# Patient Record
Sex: MALE | Race: WHITE | Employment: OTHER | URBAN - METROPOLITAN AREA
[De-identification: names, ages, dates, MRNs, and addresses within clinical notes are randomized per-mention and may not be internally consistent; named-entity substitution may affect disease eponyms.]

---

## 2018-11-08 ENCOUNTER — TELEPHONE (OUTPATIENT)
Dept: CARDIOLOGY CLINIC | Age: 58
End: 2018-11-08

## 2018-11-26 ENCOUNTER — OFFICE VISIT (OUTPATIENT)
Dept: CARDIOLOGY CLINIC | Age: 58
End: 2018-11-26

## 2018-11-26 VITALS
OXYGEN SATURATION: 97 % | SYSTOLIC BLOOD PRESSURE: 135 MMHG | HEIGHT: 67 IN | BODY MASS INDEX: 34.37 KG/M2 | WEIGHT: 219 LBS | HEART RATE: 89 BPM | DIASTOLIC BLOOD PRESSURE: 90 MMHG

## 2018-11-26 DIAGNOSIS — E78.00 PURE HYPERCHOLESTEROLEMIA: ICD-10-CM

## 2018-11-26 DIAGNOSIS — I10 ESSENTIAL HYPERTENSION WITH GOAL BLOOD PRESSURE LESS THAN 140/90: Primary | ICD-10-CM

## 2018-11-26 DIAGNOSIS — I48.0 PAF (PAROXYSMAL ATRIAL FIBRILLATION) (HCC): ICD-10-CM

## 2018-11-26 RX ORDER — AMIODARONE HYDROCHLORIDE 200 MG/1
200 TABLET ORAL DAILY
Qty: 90 TAB | Refills: 3 | Status: SHIPPED | OUTPATIENT
Start: 2018-11-26 | End: 2019-03-12 | Stop reason: SDUPTHER

## 2018-11-26 RX ORDER — DILTIAZEM HYDROCHLORIDE EXTENDED-RELEASE TABLETS 180 MG/1
180 TABLET, EXTENDED RELEASE ORAL DAILY
COMMUNITY
End: 2019-03-12 | Stop reason: SDUPTHER

## 2018-11-26 NOTE — PROGRESS NOTES
[facility]ists    Mr. Arabella Dukes is a 65year old male with a history of paroxysmal atrial fibrillation and hyperlipidemia. Mr. Arabella Dukes is here to establish care with me. Mr. Arabella Dukes follows up at the cardiology department at the [facility].  He has been diagnosed with atrial fibrillation, paroxysmal in nature, since 2010. At times he has been symptomatic. The primary cardiologist at the [facility] recommended him to possibly undergo atrial fibrillation ablation at [address] [place] [address], however he underwent cardioversion. He is here today for second opinion. Mr. Arabella Dukes denies any history of hypertension, stroke or diabetes. He does have hyperlipidemia, however he is not on any medication as he had side effects from Atorvastatin. He is controlling it with diet. Mr. Arabella Dukes has occasional palpitations on and off, however it is not associated with any presyncope or syncope. He occasionally gets fatigued and tired. He has been told by  that he snores heavily and also stops breathing a few seconds during the nighttime. During the daytime occasionally he feels fatigued, tired and feels like he has to take naps. He does not report any presyncope or syncope. Denies any nausea, vomiting, abdominal pain, fever, chills, sputum production. No hematuria or other bleeding complaints    Past Medical History:   Diagnosis Date    A-fib [facility])     S/P cardioversion 08/18    HLD (hyperlipidemia)          No past surgical history on file. Current Outpatient Medications   Medication Sig    rivaroxaban (XARELTO) 20 mg tab tablet Take  by mouth daily.  dilTIAZem ER (CARDIZEM LA) 180 mg Tb24 tablet Take 180 mg by mouth daily.  amiodarone (CORDARONE) 200 mg tablet Take 1 Tab by mouth daily. No current facility-administered medications for this visit.         Allergies and Sensitivities:  No Known Allergies    Family History:  No family history on file. Social History:  Social History     Tobacco Use    Smoking status: Former Smoker     Types: Cigarettes     Last attempt to quit: 11/26/2008     Years since quitting: 10.0    Smokeless tobacco: Never Used   Substance Use Topics    Alcohol use: Not on file    Drug use: Not on file     He  reports that he quit smoking about 10 years ago. His smoking use included cigarettes. he has never used smokeless tobacco.  He  has no alcohol history on file. Review of Systems:  Cardiac symptoms as noted above in HPI. All others negative. Denies fatigue, malaise, skin rash, joint pain, blurring vision, photophobia, neck pain, hemoptysis, chronic cough, nausea, vomiting, hematuria, burning micturition, BRBPR, chronic headaches. Physical Exam:  BP Readings from Last 3 Encounters:   11/26/18 135/90         Pulse Readings from Last 3 Encounters:   11/26/18 89          Wt Readings from Last 3 Encounters:   11/26/18 219 lb (99.3 kg)       Constitutional: Oriented to person, place, and time. HENT: Head: Normocephalic and atraumatic. Eyes: Conjunctivae and extraocular motions are normal.   Neck: No JVD present. Carotid bruit is not appreciated. Cardiovascular: Irregular rhythm. No murmur, gallop or rubs appreciated  Lung: Breath sounds normal. No respiratory distress. No ronchi or rales appreciated  Abdominal: No tenderness. No rebound and no guarding. Musculoskeletal: There is no lower extremity edema. No cynosis  Lymphadenopathy:  No cervical or supraclavicular adenopathy appriciated. Neurological: No gross motor deficit noted. Skin: No visible skin rash noted. No Ear discharge noted  Psychiatric: Normal mood and affect. Good distal pulse      Review of Data  LABS:   No results found for: NA, K, CL, CO2, GLU, BUN, CREA  No flowsheet data found.   No results found for: GPT, ALT  No results found for: HBA1C, HGBE8, WFE4QEPH, SKM0LWRU    EKG  (10/18) A.fib    ECHO    STRESS TEST (EST, PHARM, NUC, ECHO etc)    IMPRESSION & PLAN:  Mr. Gustavo Leon is a 65year old male with a history of atrial fibrillation, hyperlipidemia. Atrial fibrillation:  He has been diagnosed with atrial fibrillation since 2010. In August, 2018 he underwent external cardioversion at [address]. It was successful, however within a few weeks he went back into atrial fibrillation. It appears somewhat asymptomatic. He is currently on calcium channel blocker for rate control and he is on Xarelto for stroke prophylaxis. I would recommend to continue same at this time. We discussed at length today about etiology and management of atrial fibrillation, including rhythm control strategy versus rate control strategy. Because he is symptomatic he would like to consider rhythm control strategy. Chemical cardioversion versus electrical cardioversion and also ablation was discussed in detail. It was concerning to me that he may have underlying sleep apnea and without appropriate diagnosis and even management. He may not have success in rhythm control strategy and rate control strategy. For that reason I am going to ask him to undergo sleep study. I am going to start him on Amiodarone 200 mg daily. After appropriate lowering with Amiodarone and after making sure he does not have any sleep apnea, I would recommend that he undergo electrical cardioversion after being on Amiodarone. He appears to be acceptable to this strategy rather than invasive electrical atrial fibrillation ablation. Sleep study has been ordered and Amiodarone has been started. Cardizem and Xarelto will be continued. Side effects of medication were discussed with the patient. He had a lipid profile and TSH checked at [facility] and everything was normal according to patient. Borderline hyperlipidemia:  He is on diet control.   In the past he was tried on Atorvastatin, however he had some side effects so he stopped taking that medication after taking two doses.  I would recommend to continue with diet and exercise program.    Hypertension:  His blood pressure today is 135/90 mmHg. He is on Diltiazem 180 mg daily, which I recommend to continue. Importance of diet and exercise was discussed with patient. This plan was discussed with patient who is in agreement. Thank you for allowing me to participate in patient care. Please feel free to call me if you have any question or concern. Yifan Toure MD  Please note: This document has been produced using voice recognition software. Unrecognized errors in transcription may be present.

## 2018-11-26 NOTE — PROGRESS NOTES
1. Have you been to the ER, urgent care clinic since your last visit? Hospitalized since your last visit? No    2. Have you seen or consulted any other health care providers outside of the [address] [place] [address] since your last visit? Include any pap smears or colon screening.  No

## 2018-12-03 ENCOUNTER — TELEPHONE (OUTPATIENT)
Dept: CARDIOLOGY CLINIC | Age: 58
End: 2018-12-03

## 2018-12-03 NOTE — TELEPHONE ENCOUNTER
Patient left message questioning if he should be taking Diltiazem and amiodarone.      Verbal order and read back per Italia Campa MD  Yes, patient needs both of these medications  If he still has concerns he needs to schedule a follow up visit in the office     [facility] for patient

## 2018-12-13 ENCOUNTER — TELEPHONE (OUTPATIENT)
Dept: CARDIOLOGY CLINIC | Age: 58
End: 2018-12-13

## 2018-12-13 NOTE — TELEPHONE ENCOUNTER
Incoming from pt. Two patient Identifiers confirmed. Pt stated his sleep study was cancelled with [facility] and he will get it through the [place] and have results faxed to us. Pt verbalized understanding.

## 2019-02-20 ENCOUNTER — TELEPHONE (OUTPATIENT)
Dept: SLEEP MEDICINE | Age: 59
End: 2019-02-20

## 2019-03-10 ENCOUNTER — HOSPITAL ENCOUNTER (OUTPATIENT)
Dept: SLEEP MEDICINE | Age: 59
Discharge: HOME | End: 2019-03-10
Payer: OTHER GOVERNMENT

## 2019-03-10 DIAGNOSIS — G47.33 OSA (OBSTRUCTIVE SLEEP APNEA): ICD-10-CM

## 2019-03-10 PROCEDURE — 95811 POLYSOM 6/>YRS CPAP 4/> PARM: CPT

## 2019-03-11 VITALS — HEART RATE: 72 BPM | DIASTOLIC BLOOD PRESSURE: 87 MMHG | SYSTOLIC BLOOD PRESSURE: 123 MMHG

## 2019-03-12 ENCOUNTER — OFFICE VISIT (OUTPATIENT)
Dept: CARDIOLOGY CLINIC | Age: 59
End: 2019-03-12

## 2019-03-12 VITALS
SYSTOLIC BLOOD PRESSURE: 125 MMHG | BODY MASS INDEX: 35.4 KG/M2 | DIASTOLIC BLOOD PRESSURE: 81 MMHG | OXYGEN SATURATION: 98 % | HEART RATE: 102 BPM | WEIGHT: 226 LBS

## 2019-03-12 DIAGNOSIS — G47.30 SLEEP APNEA, UNSPECIFIED TYPE: Primary | ICD-10-CM

## 2019-03-12 RX ORDER — AMIODARONE HYDROCHLORIDE 200 MG/1
200 TABLET ORAL DAILY
Qty: 90 TAB | Refills: 3 | Status: SHIPPED | OUTPATIENT
Start: 2019-03-12

## 2019-03-12 RX ORDER — DILTIAZEM HYDROCHLORIDE EXTENDED-RELEASE TABLETS 180 MG/1
180 TABLET, EXTENDED RELEASE ORAL DAILY
Qty: 90 TAB | Refills: 3 | Status: SHIPPED | OUTPATIENT
Start: 2019-03-12 | End: 2020-09-10 | Stop reason: SDUPTHER

## 2019-03-12 NOTE — PROGRESS NOTES
1. Have you been to the ER, urgent care clinic since your last visit? Hospitalized since your last visit? no    2. Have you seen or consulted any other health care providers outside of the [address] [place] [address] since your last visit? Include any pap smears or colon screening.  yes

## 2019-03-12 NOTE — PROGRESS NOTES
Cardiovascular Specialists    Mr. Shamika Joy is a 63 y.o. male with a history of paroxysmal atrial fibrillation and hyperlipidemia. Mr. Shamika Joy is here for follow-up appointment. Symptoms since last visit he just had his sleep study 2 days ago. He was told that he has sleep apnea. He denies any palpitation presyncope or syncope. He is taking all his medications regularly. He denies any chest pain or chest tightness. Denies any nausea, vomiting, abdominal pain, fever, chills, sputum production. No hematuria or other bleeding complaints    Past Medical History:   Diagnosis Date    A-fib [facility])     S/P cardioversion 08/18    HLD (hyperlipidemia)          No past surgical history on file. Current Outpatient Medications   Medication Sig    rivaroxaban (XARELTO) 20 mg tab tablet Take  by mouth daily.  dilTIAZem ER (CARDIZEM LA) 180 mg Tb24 tablet Take 180 mg by mouth daily.  amiodarone (CORDARONE) 200 mg tablet Take 1 Tab by mouth daily. No current facility-administered medications for this visit. Allergies and Sensitivities:  No Known Allergies    Family History:  No family history on file. Social History:  Social History     Tobacco Use    Smoking status: Former Smoker     Types: Cigarettes     Last attempt to quit: 11/26/2008     Years since quitting: 10.2    Smokeless tobacco: Never Used   Substance Use Topics    Alcohol use: Not on file    Drug use: Not on file     He  reports that he quit smoking about 10 years ago. His smoking use included cigarettes. he has never used smokeless tobacco.  He  has no alcohol history on file. Review of Systems:  Cardiac symptoms as noted above in HPI. All others negative. Denies fatigue, malaise, skin rash, joint pain, blurring vision, photophobia, neck pain, hemoptysis, chronic cough, nausea, vomiting, hematuria, burning micturition, BRBPR, chronic headaches.     Physical Exam:  BP Readings from Last 3 Encounters:   03/12/19 125/81   03/11/19 123/87   11/26/18 135/90         Pulse Readings from Last 3 Encounters:   03/12/19 (!) 102   03/11/19 72   11/26/18 89          Wt Readings from Last 3 Encounters:   03/12/19 226 lb (102.5 kg)   11/26/18 219 lb (99.3 kg)       Constitutional: Oriented to person, place, and time. HENT: Head: Normocephalic and atraumatic  Neck: No JVD present. Carotid bruit is not appreciated. Cardiovascular: Irregular rhythm. No murmur, gallop or rubs appreciated  Lung: Breath sounds normal. No respiratory distress. No ronchi or rales appreciated  Abdominal: No tenderness. No rebound and no guarding. Musculoskeletal: There is no lower extremity edema. No cynosis    Review of Data  LABS:   No results found for: NA, K, CL, CO2, GLU, BUN, CREA  No flowsheet data found. No results found for: GPT, ALT  No results found for: HBA1C, HGBE8, QER2EBKI, BFB2SCMZ, MDT7MSMK    EKG  (10/18) A.fib    ECHO    STRESS TEST (EST, PHARM, NUC, ECHO etc)    IMPRESSION & PLAN:  Mr. Lana Lindsay is a 65year old male with a history of atrial fibrillation, hyperlipidemia. Atrial fibrillation:  He has been diagnosed with atrial fibrillation since 2010. In August, 2018 he underwent external cardioversion at [place] [address] facility. It was successful, however within a few weeks he went back into atrial fibrillation. He is feeling better today. He is on appropriate medication. On exam he is still in atrial fibrillation. He is on amiodarone, Cardizem and Xarelto. I recommend to continue all. He has been diagnosed with sleep apnea 2 days ago and I am going to refer him to sleep specialist for further management. I would hold onto any rhythm control strategy with external cardioversion until he is treated appropriately for his sleep apnea. He is aware of this and we will make the referral.    Borderline hyperlipidemia:  He is on diet control.   In the past he was tried on Atorvastatin, however he had some side effects so he stopped taking that medication after taking two doses. I would recommend to continue with diet and exercise program.    Hypertension:  His blood pressure today is 125/81 mmHg. He is on Diltiazem 180 mg daily, which I recommend to continue. Sleep apnea: This was diagnosed 2 days ago by sleep study. I am going to make a referral to Dr. Angel Moore for further management    Importance of diet and exercise was discussed with patient. This plan was discussed with patient who is in agreement. Thank you for allowing me to participate in patient care. Please feel free to call me if you have any question or concern. Alfredo Salamanca MD  Please note: This document has been produced using voice recognition software. Unrecognized errors in transcription may be present.

## 2019-03-20 ENCOUNTER — TELEPHONE (OUTPATIENT)
Dept: CARDIOLOGY CLINIC | Age: 59
End: 2019-03-20

## 2019-03-20 NOTE — TELEPHONE ENCOUNTER
Patient wanted to inform office he was never contacted by doctors office he was referred to by Dr. Arnaldo Weston regarding his sleep apnea.

## 2019-03-21 NOTE — TELEPHONE ENCOUNTER
[facility] patient Identifiers confirmed. Advised pt per notes below. Pt verbalized understanding.

## 2019-05-22 ENCOUNTER — TELEPHONE (OUTPATIENT)
Dept: CARDIOLOGY CLINIC | Age: 59
End: 2019-05-22

## 2019-06-25 ENCOUNTER — TELEPHONE (OUTPATIENT)
Dept: CARDIOLOGY CLINIC | Age: 59
End: 2019-06-25

## 2019-06-25 NOTE — TELEPHONE ENCOUNTER
Patient was referred to Pulmonary Specialist for evaluation after sleep study testing, patient has [employer] and will need an authorization to Pulmonary. Please call Huseyin Rausch at [phone] with any question or authorization information.

## 2019-06-26 ENCOUNTER — OFFICE VISIT (OUTPATIENT)
Dept: PULMONOLOGY | Age: 59
End: 2019-06-26

## 2019-06-26 VITALS
TEMPERATURE: 98.2 F | HEIGHT: 67 IN | HEART RATE: 78 BPM | BODY MASS INDEX: 33.95 KG/M2 | SYSTOLIC BLOOD PRESSURE: 126 MMHG | OXYGEN SATURATION: 97 % | DIASTOLIC BLOOD PRESSURE: 70 MMHG | WEIGHT: 216.3 LBS | RESPIRATION RATE: 18 BRPM

## 2019-06-26 DIAGNOSIS — Z87.891 EX-SMOKER: ICD-10-CM

## 2019-06-26 DIAGNOSIS — J30.9 ALLERGIC RHINITIS, UNSPECIFIED SEASONALITY, UNSPECIFIED TRIGGER: ICD-10-CM

## 2019-06-26 DIAGNOSIS — E66.9 OBESITY (BMI 30-39.9): ICD-10-CM

## 2019-06-26 DIAGNOSIS — G47.33 OSA (OBSTRUCTIVE SLEEP APNEA): Primary | ICD-10-CM

## 2019-06-26 DIAGNOSIS — I48.20 CHRONIC ATRIAL FIBRILLATION (HCC): ICD-10-CM

## 2019-06-26 DIAGNOSIS — E78.5 HYPERLIPIDEMIA, UNSPECIFIED HYPERLIPIDEMIA TYPE: ICD-10-CM

## 2019-06-26 RX ORDER — FLUTICASONE PROPIONATE 50 MCG
2 SPRAY, SUSPENSION (ML) NASAL DAILY
Qty: 1 BOTTLE | Refills: 3 | Status: SHIPPED | OUTPATIENT
Start: 2019-06-26 | End: 2020-09-10 | Stop reason: ALTCHOICE

## 2019-06-26 RX ORDER — LORATADINE 10 MG/1
10 TABLET ORAL DAILY
Qty: 90 TAB | Refills: 3 | Status: SHIPPED | OUTPATIENT
Start: 2019-06-26 | End: 2020-09-10 | Stop reason: ALTCHOICE

## 2019-06-26 NOTE — LETTER
6/26/19 Patient: Khadijah Vergara YOB: 1960 Date of Visit: 6/26/2019 Sandro Jansen MD 
[address] [facility] [address] VIA Facsimile: [phone] Nay [place] MD 
[address] [facility] [address] [facility] [place] [id number] VIA In Basket Dear MD Nay Mcelroy MD, Thank you for referring Mr. Khadijah Vergara to [facility] [place] for evaluation. My notes for this consultation are attached. If you have questions, please do not hesitate to call me. I look forward to following your patient along with you.  
 
 
Sincerely, 
 
Chasidy Vargas, DO

## 2019-06-26 NOTE — PROGRESS NOTES
Linda Larsen presents today for   Chief Complaint   Patient presents with    Sleep Apnea       Is someone accompanying this pt? No    Is the patient using any DME equipment during OV? No    -DME Company None    Depression Screening:  3 most recent PHQ Screens 6/26/2019   Little interest or pleasure in doing things Not at all   Feeling down, depressed, irritable, or hopeless Not at all   Total Score PHQ 2 0       Learning Assessment:  Learning Assessment 6/26/2019   PRIMARY LEARNER Patient   PRIMARY LANGUAGE ENGLISH   LEARNER PREFERENCE PRIMARY DEMONSTRATION     READING   ANSWERED BY Patient   RELATIONSHIP SELF       Abuse Screening:  No flowsheet data found. Fall Risk  No flowsheet data found. Coordination of Care:  1. Have you been to the ER, urgent care clinic since your last visit? Hospitalized since your last visit? No    2. Have you seen or consulted any other health care providers outside of the [address] [place] [address] since your last visit? Include any pap smears or colon screening.  No

## 2019-06-26 NOTE — PROGRESS NOTES
[facility]  Pulmonary, Critical Care, and Sleep Medicine    Office Progress Note- Initial Evaluation      Primary Care Physician: Frida López MD     Reason for Visit:  Evaluation for : Obstructive Sleep Apnea (STEVEN)      Assessment:  1. Obstructive Sleep Apnea (STEVEN)- Severe (AHI: 40- 3/10/19)- currently not treated  2. Atrial Fibrillation (AFIB)- On Amiodarone, Xarelto and Cardizem- Cardiology: Dr. Carey Johnson  3. Allergic Rhinnitis  4. Hyperlipidemia  5. Obesity: Body mass index is 33.88 kg/m². 6. Ex-Smoker           Plan:    · Schedule patient for  BIPAP sleep study for further evaluation. · Start a trial of Flonase and Claritin- Written Rx given to patient to take to [facility]  · Potential consequences of untreated sleep apnea, and/or excessive daytime sleepiness were discussed with the patient. · Educational materials provided. · Treatment options including CPAP, dental appliance, weight reduction measures, positional therapy, surgeries etc were discussed. · Healthy lifestyle changes to include weight loss and exercise discussed. · Healthy sleep habits were reviewed and encouraged. ·  and workplace safety reviewed and discussed as appropriate. Drowsy and/or inattentive driving should be avoided. · Follow-up in after sleep study , sooner should new symptoms or problems arise. · Follow up with Primary Care Provider (PCP) as directed and for routine health care maintenance. History of Present Illness: Mr. Martina Morales is a 63 y.o. male patient who was referred to us by his OP Cardiologist, Dr. Carey Johnson. . The history was provided by the patient. The patient receives much of his care from the [address] [place] [address]. During the patient's workup with cardiology, he was sent for a sleep study which was notable for severe STEVEN. A Split study was performed and the patient was titrated to a final CPAP setting of 19 with persistent respiratory events. The patient now presents to our clinic for additional evaluation and discussion of test results and discussion for further plan of care. Occupation:  Unemployed, ,  , [address]                    Driving:  yes  Drowsy Driving: is not reported. Motor vehicle accident(s) associated with drowsy driving have not occurred. Snoring: This is a Chronic problem which has been ongoing for years. Witnessed apneas are reported by prior girl friend who would shake him awake. Fatigue: This is a Chronic problem which has been ongoing for years. Dental: Teeth clenching or grindingis not reported. Naps: are not reported. He can relax enough during the day to fall asleep. Leg Symptoms/Pain: He does not have unpleasant or crawling sensation in legs or strong urge to move when inactive. GERD: is not reported. Mood: Feels that he is more \"even keel\", tries to keep positive outlook. Denies depression or anxiety. Sleep-Wake History:     Estimates sleeping approximately 6-8 hours per night/day. Reports sleeping in a Bed with 2, thin pillows. He gets into bed at approximately . Once in bed,he turns the lights out and goes to sleep. It usually takes up to 60  minutes to fall asleep after going to bed. Reports waking up to use the bathroom 1-2 times nightly. Pain, typically does not disturb their sleep. Vivid dreams are reported when he wakes up in the morning to use the bathroom and he then goes back to bed. He normally awakens with an alarm to start their day at 0500. He  typically gets out of bed at [id number] . Waking up with a morning headache is not reported. Awakening with a dry mouth is reported. Symptom(s) suggestive of cataplexy are not reported. Sleep paralysis is not  reported. Hypnagogic and/or hypnopompic hallucinations is not . Sleep walking is not  reported. Sleep talking is not  report.     Other unusual and/or parasomnia behaviors are not reported. Family Sleep History: Not known    Stop Cristobal Prater 6/26/2019 3/10/2019   Does the patient snore loudly (louder than talking or loud enough to be heard through closed doors)? 0 1   Does the patient often feel tired, fatigued, or sleepy during the daytime, even after a \"good\" night's sleep? 0 1   Has anyone ever observed the patient stop breathing during their sleep? 1 1   Does the patient have or are they being treated for high blood pressure? 0 0   Is the patient's BMI greater than 35? 0 0   Is your neck circumference greater than 17 inches (Male) or 16 inches (Female)? 0 0   Is the patient older than 48? 1 1   Is the patient male? 1 1   STEVEN Score 3 5   Has the patient been referred to Sleep Medicine? - 0   Has the patient previously been diagnosed with Obstructive Sleep Apnea? - 0       3 most recent PHQ Screens 6/26/2019   Little interest or pleasure in doing things Not at all   Feeling down, depressed, irritable, or hopeless Not at all   Total Score PHQ 2 0       [place] Scale 6/26/2019   Sitting and Reading 0   Watching TV 1   Sitting, inactive in a public place (e.g. a movie theater or meeting) 0   As a passenger in a car for an hour, without a break 0   Lying down to rest in the afternoon, when circumstances permit 1   Sitting and talking to someone 0   Sitting quietly after lunch without alcohol 0   In a car, while stopped for a few minutes in traffic 0   [place] Sleepiness Score 2        Neck circ. in \"inches\": 17     Past Medical History:  Past Medical History:   Diagnosis Date    A-fib [facility])     S/P cardioversion 08/18    HLD (hyperlipidemia)     Sleep apnea        Past Surgical History:  History reviewed. No pertinent surgical history. Family History:  History reviewed. No pertinent family history.     Social History:  Social History     Tobacco Use    Smoking status: Former Smoker     Types: Cigarettes     Last attempt to quit: 11/26/2008     Years since quitting: 10.5    Smokeless tobacco: Never Used   Substance Use Topics    Alcohol use: Not on file    Drug use: Not on file        Caffeine Amount Time of last Intake Comments   Coffee 16 oz/am     Soda 2x/week  Root Beet- Non Caffiated   Tea None     Energy Drinks None     Over- the - counter stimulant pills None     Other Substances      Alcohol 2x/years  Use to be a nightly drinker in 20's   Tobacco Former     Drugs None  Distant Cocaine 1998       Medications:  Current Outpatient Medications on File Prior to Visit   Medication Sig Dispense Refill    rivaroxaban (XARELTO) 20 mg tab tablet Take 1 Tab by mouth daily. 90 Tab 3    dilTIAZem ER (CARDIZEM LA) 180 mg Tb24 tablet Take 1 Tab by mouth daily. 90 Tab 3    amiodarone (CORDARONE) 200 mg tablet Take 1 Tab by mouth daily. 90 Tab 3     No current facility-administered medications on file prior to visit.          Allergy:  No Known Allergies    Review of Systems  General ROS: positive for  - fatigue and sleep disturbance  negative for - chills, fever, hot flashes, malaise or night sweats  ENT ROS: negative for - epistaxis, hearing change, nasal polyps, oral lesions, sinus pain, sneezing, sore throat, tinnitus, vertigo, visual changes or vocal changes, positive for chronic nasal congestion  Hematological and Lymphatic ROS: negative for - bleeding problems, blood clots, bruising, jaundice, pallor or swollen lymph nodes  Endocrine ROS: negative for - polydipsia/polyuria, skin changes, temperature intolerance or unexpected weight changes  Respiratory ROS: no cough, shortness of breath, or wheezing  Cardiovascular ROS: no chest pain or dyspnea on exertion  Gastrointestinal ROS: no abdominal pain, change in bowel habits, or black or bloody stools  Genito-Urinary ROS: no dysuria, trouble voiding, or hematuria  Musculoskeletal ROS: as above  Neurological ROS: no TIA or stroke symptoms  Dermatological ROS: negative for - pruritus, rash or skin lesion changes Psychological ROS: as above   Otherwise negative. Physical Exam:  Blood pressure 126/70, pulse 78, temperature 98.2 °F (36.8 °C), temperature source Oral, resp. rate 18, height 5' 7\" (1.702 m), weight 98.1 kg (216 lb 4.8 oz), SpO2 97 %. on RA, Body mass index is 33.88 kg/m². General: No distress, acyanotic, appears stated age, cooperative, pleasant  HEENT: PERRL, EOMI, throat without erythema or exudate, Tongue- normal size, without dental indention on tongue, narrow oral airway Mallampati's score 4+, Uvula- midline, Tonsils- not seen, Moderate retrognathia present, + nasal congestion- boggy turbinates, narrow nasal passages. Neck: Supple,  no abnormally enlarged lymph nodes, thyroid is not enlarged, non-tender, No JVD, no carotid bruits  Chest: Normal.  Lungs: Moderate air entry, clear to auscultation bilaterally,   Heart:  IRRegular rate and rhythm, S1S2 present, without murmur. Abdomen: Protuberant, bowel sounds normoactive, abdomen is soft without significant tenderness, or guarding. Extremity: Negative for cyanosis, edema, or clubbing. Skin: Skin color, texture, turgor normal. No rashes or lesions. Neurological: CN 2-12 grossly intact, normal muscle tone. Data Reviewed:  CBC: No results found for: WBC, WBCLT, HGBPOC, HGB, HGBP, HCTPOC, HCT, PHCT, RBCH, PLT, MCV, HGBEXT, HCTEXT, PLTEXT, HGBEXT, HCTEXT, PLTEXT    BMP: No results found for: NA, K, CL, CO2, AGAP, GLU, BUN, CREA, BUCR, GFRAA, GFRNA, CA, GFRAA     TSH:  No results found for: TSH, TSH2, TSH3, TSHP, TSHELE, TSHEXT, TSHEXT     No results found for: BNP, BNPP, BNPPPOC, XBNPT, BNPNT      Imaging:  [x]I have personally reviewed the patients radiographs section   No results found for this or any previous visit. No results found for this or any previous visit.      Cardiac Echo:          Historical Sleep Testing Data:  - Split Study: [facility]: 3/10/19: AHI: 40, RDI:44, Spo2 michelle: 86%, SOLOMON: 39.5        Pj Lugo DO, FCCP  Pulmonary, Sleep and Critical Care Medicine

## 2019-07-02 NOTE — TELEPHONE ENCOUNTER
Spoke to [place], he was approved for sleep consult already. Auth # [id number]  Sleep study/consult valid until September 2019     Radha Bull at [place] states he is up to date with his auth/referrals for cardiology and sleep.

## 2019-07-07 ENCOUNTER — HOSPITAL ENCOUNTER (OUTPATIENT)
Dept: SLEEP MEDICINE | Age: 59
Discharge: HOME | End: 2019-07-07
Payer: OTHER GOVERNMENT

## 2019-07-07 DIAGNOSIS — E66.9 OBESITY (BMI 30-39.9): ICD-10-CM

## 2019-07-07 DIAGNOSIS — J30.9 ALLERGIC RHINITIS, UNSPECIFIED SEASONALITY, UNSPECIFIED TRIGGER: ICD-10-CM

## 2019-07-07 DIAGNOSIS — I48.20 CHRONIC ATRIAL FIBRILLATION (HCC): ICD-10-CM

## 2019-07-07 DIAGNOSIS — G47.33 OSA (OBSTRUCTIVE SLEEP APNEA): ICD-10-CM

## 2019-07-07 PROCEDURE — 95811 POLYSOM 6/>YRS CPAP 4/> PARM: CPT

## 2019-07-08 VITALS — SYSTOLIC BLOOD PRESSURE: 125 MMHG | HEART RATE: 70 BPM | DIASTOLIC BLOOD PRESSURE: 85 MMHG

## 2019-07-09 ENCOUNTER — OFFICE VISIT (OUTPATIENT)
Dept: CARDIOLOGY CLINIC | Age: 59
End: 2019-07-09

## 2019-07-09 ENCOUNTER — DOCUMENTATION ONLY (OUTPATIENT)
Dept: PULMONOLOGY | Age: 59
End: 2019-07-09

## 2019-07-09 VITALS
DIASTOLIC BLOOD PRESSURE: 71 MMHG | OXYGEN SATURATION: 97 % | HEART RATE: 84 BPM | BODY MASS INDEX: 33.52 KG/M2 | SYSTOLIC BLOOD PRESSURE: 120 MMHG | WEIGHT: 214 LBS

## 2019-07-09 DIAGNOSIS — I10 ESSENTIAL HYPERTENSION WITH GOAL BLOOD PRESSURE LESS THAN 140/90: ICD-10-CM

## 2019-07-09 DIAGNOSIS — I48.0 PAF (PAROXYSMAL ATRIAL FIBRILLATION) (HCC): Primary | ICD-10-CM

## 2019-07-09 DIAGNOSIS — E78.00 PURE HYPERCHOLESTEROLEMIA: ICD-10-CM

## 2019-07-09 DIAGNOSIS — G47.30 SLEEP APNEA, UNSPECIFIED TYPE: ICD-10-CM

## 2019-07-09 NOTE — PROGRESS NOTES
The patient underwent sleep testing on 7/7/19. Recommendations listed below. Please refer to full report for specific details. Overall, the patient tolerated study well but did report some difficulty with pressure. Central apneas without Jazmin Fendt emerged with onset of CPAP titration. The patient was subsequently changed to BIPAP. Central events significantly improved as did sleep architecture but some central events did persist.  Complex sleep physiology appeared failure well optimized at a BIPAP setting of 19/15. REM sleep was achieved in the lateral recumbent position. REM supine sleep was not achieved at recommended pressure. <BR>PRESSURES USED DURING THE STUDY: 8/4, 9/5, 10/6, 11/7, 12/7, 13/7, 14/9, 16/11, 17/12, 19/14, 19/15 cwp. DIAGNOSIS:  Complex Sleep Apnea ()    RECOMMENDATIONS:      Start patient on BIPAP 19/15 with back up respiratory rate of 8 breaths per minute.  Positional therapy with the patient sleeping on their side is encouraged.  Other non-invasive treatment options are recommended were applicable and include the following: weight reduction, smoking cessation, body position training, and modification of alcohol ingestion and/or sedating agents.  If these treatments are not possible or effective, an oral appliance may be an alternative and/or adjuvant non-invasive treatment.  If non-invasive treatments are not possible or effective, consideration may be given to possible corrective surgical options.  Healthy sleep habit education and reinforcement will be reviewed with the patient.  Individuals are encouraged to obtain 7-9 hours of sleep per day.   safety is encouraged. Drowsy and/or inattentive driving should be avoided.  Follow up with referring sleep provider as directed.       Erick Saunders DO, [facility]    [facility]  Pulmonary, Critical Care, and Sleep Medicine

## 2019-07-09 NOTE — PROGRESS NOTES
[facility]    Mr. Silke Emerson is a 63 y.o. male with a history of paroxysmal atrial fibrillation and hyperlipidemia. Mr. Silke Emerson is here for follow-up appointment. She has been diagnosed with severe sleep apnea and had a titration study done last week. He is awaiting appointment with sleep apnea clinic. He is taking cardiac medications regularly. He denies any symptoms to suggest angina or heart failure he denies any palpitation, presyncope or syncope. He is taking all his medications regularly  Denies any nausea, vomiting, abdominal pain, fever, chills, sputum production. No hematuria or other bleeding complaints    Past Medical History:   Diagnosis Date    A-fib [facility])     S/P cardioversion 08/18    HLD (hyperlipidemia)     Sleep apnea          No past surgical history on file. Current Outpatient Medications   Medication Sig    fluticasone propionate (FLONASE ALLERGY RELIEF) 50 mcg/actuation nasal spray 2 Sprays by Both Nostrils route daily. Indications: inflammation of the nose due to an allergy    loratadine (CLARITIN) 10 mg tablet Take 1 Tab by mouth daily.  rivaroxaban (XARELTO) 20 mg tab tablet Take 1 Tab by mouth daily.  dilTIAZem ER (CARDIZEM LA) 180 mg Tb24 tablet Take 1 Tab by mouth daily.  amiodarone (CORDARONE) 200 mg tablet Take 1 Tab by mouth daily. No current facility-administered medications for this visit. Allergies and Sensitivities:  No Known Allergies    Family History:  No family history on file. Social History:  Social History     Tobacco Use    Smoking status: Former Smoker     Types: Cigarettes     Last attempt to quit: 11/26/2008     Years since quitting: 10.6    Smokeless tobacco: Never Used   Substance Use Topics    Alcohol use: Not on file    Drug use: Not on file     He  reports that he quit smoking about 10 years ago. His smoking use included cigarettes.  He has never used smokeless tobacco.  He  has no alcohol history on file. Review of Systems:  Cardiac symptoms as noted above in HPI. All others negative. Denies fatigue, malaise, skin rash, joint pain, blurring vision, photophobia, neck pain, hemoptysis, chronic cough, nausea, vomiting, hematuria, burning micturition, BRBPR, chronic headaches. Physical Exam:  BP Readings from Last 3 Encounters:   07/09/19 120/71   07/08/19 125/85   06/26/19 126/70         Pulse Readings from Last 3 Encounters:   07/09/19 84   07/08/19 70   06/26/19 78          Wt Readings from Last 3 Encounters:   07/09/19 214 lb (97.1 kg)   06/26/19 216 lb 4.8 oz (98.1 kg)   03/12/19 226 lb (102.5 kg)       Constitutional: Oriented to person, place, and time. HENT: Head: Normocephalic and atraumatic  Neck: No JVD present. Carotid bruit is not appreciated. Cardiovascular: Irregular rhythm. No murmur, gallop or rubs appreciated  Lung: Breath sounds normal. No respiratory distress. No ronchi or rales appreciated  Abdominal: No tenderness. No rebound and no guarding. Musculoskeletal: There is no lower extremity edema. No cynosis    Review of Data  LABS:   No results found for: NA, K, CL, CO2, GLU, BUN, CREA  No flowsheet data found. No results found for: GPT, ALT  No results found for: HBA1C, HGBE8, QDO9MHAG, VSZ1PYDS, VTX9JKJV    EKG  (10/18) A.fib    ECHO    STRESS TEST (EST, PHARM, NUC, ECHO etc)    IMPRESSION & PLAN:  Mr. Win Varela is a 65year old male with a history of atrial fibrillation, hyperlipidemia. Atrial fibrillation:  He has been diagnosed with atrial fibrillation since 2010. In August, 2018 he underwent external cardioversion at [address]. It was successful, however within a few weeks he went back into atrial fibrillation. He is on appropriate medication. On exam he is still in atrial fibrillation. He is on amiodarone, Cardizem and Xarelto. I recommend to continue all.   He has been diagnosed with sleep apnea and has undergone titration study. I would hold onto any rhythm control strategy with external cardioversion until he is treated appropriately for his sleep apnea. He is going to call sleep apnea clinic for follow-up appointment    Borderline hyperlipidemia:  He is on diet control. In the past he was tried on Atorvastatin, however he had some side effects so he stopped taking that medication after taking two doses. I would recommend to continue with diet and exercise program.    Hypertension:  His blood pressure today is 120/71 mmHg. He is on Diltiazem 180 mg daily, which I recommend to continue. Sleep apnea:  He has severe sleep apnea and has undergone titration sleep study. He is awaiting to follow-up with Dr. Corey Zamarripa for further definitive management    This plan was discussed with patient who is in agreement. Thank you for allowing me to participate in patient care. Please feel free to call me if you have any question or concern. Lynn Ames MD  Please note: This document has been produced using voice recognition software. Unrecognized errors in transcription may be present.

## 2019-07-09 NOTE — PROGRESS NOTES
1. Have you been to the ER, urgent care clinic since your last visit? Hospitalized since your last visit? No     2. Have you seen or consulted any other health care providers outside of the [address] [place] [address] since your last visit? Include any pap smears or colon screening.   No

## 2019-07-10 NOTE — PROGRESS NOTES
Just place a normal DME order. I will contact patient and see if he wants to  order or if he wants me to mail it to him.

## 2019-07-17 ENCOUNTER — TELEPHONE (OUTPATIENT)
Dept: PULMONOLOGY | Age: 59
End: 2019-07-17

## 2019-07-22 ENCOUNTER — TELEPHONE (OUTPATIENT)
Dept: PULMONOLOGY | Age: 59
End: 2019-07-22

## 2019-08-15 ENCOUNTER — TELEPHONE (OUTPATIENT)
Dept: CARDIOLOGY CLINIC | Age: 59
End: 2019-08-15

## 2019-08-15 NOTE — TELEPHONE ENCOUNTER
Patient called and LM on nursing line that he is supposed to get CPAP machine. Returned patient call and had to leave him a message. He should be calling the sleep clinic to get set up. I can see notes from the office that his test was positive.      LMOM with the sleep clinic phone number to follow up

## 2019-10-29 ENCOUNTER — OFFICE VISIT (OUTPATIENT)
Dept: CARDIOLOGY CLINIC | Age: 59
End: 2019-10-29

## 2019-10-29 VITALS
SYSTOLIC BLOOD PRESSURE: 142 MMHG | WEIGHT: 216 LBS | BODY MASS INDEX: 33.83 KG/M2 | DIASTOLIC BLOOD PRESSURE: 89 MMHG | OXYGEN SATURATION: 96 % | HEART RATE: 77 BPM

## 2019-10-29 DIAGNOSIS — I10 ESSENTIAL HYPERTENSION WITH GOAL BLOOD PRESSURE LESS THAN 140/90: ICD-10-CM

## 2019-10-29 DIAGNOSIS — E78.00 PURE HYPERCHOLESTEROLEMIA: ICD-10-CM

## 2019-10-29 DIAGNOSIS — G47.30 SLEEP APNEA, UNSPECIFIED TYPE: ICD-10-CM

## 2019-10-29 DIAGNOSIS — I48.0 PAF (PAROXYSMAL ATRIAL FIBRILLATION) (HCC): Primary | ICD-10-CM

## 2019-10-29 RX ORDER — CHOLECALCIFEROL (VITAMIN D3) 125 MCG
CAPSULE ORAL
COMMUNITY

## 2019-10-29 NOTE — PROGRESS NOTES
[facility]    Mr. Donte Fernandez is a 63 y.o. male with a history of paroxysmal atrial fibrillation and hyperlipidemia. Mr. Donte Fernandez is here for follow-up appointment. Patient has been diagnosed with sleep apnea and is trying to get used to the CPAP machine. He is not able to wear it consistently. He denies any chest pain or chest tightness. He has occasional palpitation. He denies presyncope or syncope. Overall his symptoms are improving  Denies any nausea, vomiting, abdominal pain, fever, chills, sputum production. No hematuria or other bleeding complaints    Past Medical History:   Diagnosis Date    A-fib [facility])     S/P cardioversion 08/18    HLD (hyperlipidemia)     Sleep apnea          No past surgical history on file. Current Outpatient Medications   Medication Sig    cholecalciferol, vitamin D3, (VITAMIN D3) 2,000 unit tab Take  by mouth.  rivaroxaban (XARELTO) 20 mg tab tablet Take 1 Tab by mouth daily.  dilTIAZem ER (CARDIZEM LA) 180 mg Tb24 tablet Take 1 Tab by mouth daily.  amiodarone (CORDARONE) 200 mg tablet Take 1 Tab by mouth daily.  fluticasone propionate (FLONASE ALLERGY RELIEF) 50 mcg/actuation nasal spray 2 Sprays by Both Nostrils route daily. Indications: inflammation of the nose due to an allergy    loratadine (CLARITIN) 10 mg tablet Take 1 Tab by mouth daily. No current facility-administered medications for this visit. Allergies and Sensitivities:  No Known Allergies    Family History:  No family history on file. Social History:  Social History     Tobacco Use    Smoking status: Former Smoker     Types: Cigarettes     Last attempt to quit: 11/26/2008     Years since quitting: 10.9    Smokeless tobacco: Never Used   Substance Use Topics    Alcohol use: Not on file    Drug use: Not on file     He  reports that he quit smoking about 10 years ago. His smoking use included cigarettes.  He has never used smokeless tobacco.  He  has no alcohol history on file. Review of Systems:  Cardiac symptoms as noted above in HPI. All others negative. Denies fatigue, malaise, skin rash, joint pain, blurring vision, photophobia, neck pain, hemoptysis, chronic cough, nausea, vomiting, hematuria, burning micturition, BRBPR, chronic headaches. Physical Exam:  BP Readings from Last 3 Encounters:   10/29/19 142/89   07/09/19 120/71   07/08/19 125/85         Pulse Readings from Last 3 Encounters:   10/29/19 77   07/09/19 84   07/08/19 70          Wt Readings from Last 3 Encounters:   10/29/19 216 lb (98 kg)   07/09/19 214 lb (97.1 kg)   06/26/19 216 lb 4.8 oz (98.1 kg)       Constitutional: Oriented to person, place, and time. HENT: Head: Normocephalic and atraumatic  Neck: No JVD present. Carotid bruit is not appreciated. Cardiovascular: Irregular rhythm. No murmur, gallop or rubs appreciated  Lung: Breath sounds normal. No respiratory distress. No ronchi or rales appreciated  Abdominal: No tenderness. No rebound and no guarding. Musculoskeletal: There is no lower extremity edema. No cynosis    Review of Data  LABS:   No results found for: NA, K, CL, CO2, GLU, BUN, CREA  No flowsheet data found. No results found for: GPT, ALT  No results found for: HBA1C, HGBE8, WMO4OXTT, BMH6WSFF, [id number]    EKG  (10/18) A.fib    ECHO    STRESS TEST (EST, PHARM, NUC, ECHO etc)    IMPRESSION & PLAN:  Mr. Fe Moreno is a 65year old male with a history of atrial fibrillation, hyperlipidemia. Atrial fibrillation:    He has been diagnosed with atrial fibrillation since 2010. In August, 2018 he underwent external cardioversion at [facility] facility. It was successful, however within a few weeks he went back into atrial fibrillation. He is on appropriate medication. On exam he is still in atrial fibrillation. He is on amiodarone, Cardizem and Xarelto. I recommend to continue all.     He has been diagnosed with sleep apnea and has undergone titration study. He is not using CPAP machine consistently and he has follow-up appointment with sleep specialist for adjustment of pressure in couple weeks. I would hold onto any rhythm control strategy with external cardioversion until he is treated appropriately for his sleep apnea. Also we are going to give him a trial of weight loss to help reduce A. fib episodes. We will see him back in 3 months and if he is still in A. fib, plan is to consider cardioversion after appropriate sleep apnea treatment    Borderline hyperlipidemia:  He is on diet control. In the past he was tried on Atorvastatin, however he had some side effects so he stopped taking that medication after taking two doses. I would recommend to continue with diet and exercise program.    Hypertension:   He is on Diltiazem 180 mg daily, which I recommend to continue. Sleep apnea:  He has severe sleep apnea and has undergone titration sleep study. He is awaiting to follow-up with Dr. Serafin Molina for further definitive management    Obesity:  Current weight 216 pound with BMI of 34. Weight loss advised to help with A. fib episodes. He understand and he is encouraged to lose weight. This plan was discussed with patient who is in agreement. Thank you for allowing me to participate in patient care. Please feel free to call me if you have any question or concern. Trish Espinoza MD  Please note: This document has been produced using voice recognition software. Unrecognized errors in transcription may be present.

## 2019-10-29 NOTE — PROGRESS NOTES
1. Have you been to the ER, urgent care clinic since your last visit? Hospitalized since your last visit? No     2. Have you seen or consulted any other health care providers outside of the [address] [place] [address] since your last visit? Include any pap smears or colon screening.  No

## 2019-11-22 ENCOUNTER — TELEPHONE (OUTPATIENT)
Dept: PULMONOLOGY | Age: 59
End: 2019-11-22

## 2020-09-10 ENCOUNTER — OFFICE VISIT (OUTPATIENT)
Dept: CARDIOLOGY CLINIC | Age: 60
End: 2020-09-10

## 2020-09-10 DIAGNOSIS — I48.0 PAF (PAROXYSMAL ATRIAL FIBRILLATION) (HCC): Primary | ICD-10-CM

## 2020-09-10 RX ORDER — DILTIAZEM HYDROCHLORIDE EXTENDED-RELEASE TABLETS 180 MG/1
180 TABLET, EXTENDED RELEASE ORAL DAILY
Qty: 90 TAB | Refills: 3
Start: 2020-09-10 | End: 2020-09-22 | Stop reason: SDUPTHER

## 2020-09-10 RX ORDER — DILTIAZEM HYDROCHLORIDE EXTENDED-RELEASE TABLETS 240 MG/1
240 TABLET, EXTENDED RELEASE ORAL DAILY
COMMUNITY
End: 2020-09-10

## 2020-09-10 NOTE — PATIENT INSTRUCTIONS
[facility]          Patient  Cardioversion Instructions                  1. You are scheduled to have a cardioversion on  9/18/2020 , at 12:30pm.    Please check in at 10:30 am .    2.    Please go to [facility] main entrance. Once you enter check in with the  there. The  will either give you directions or assist you in getting to the cath holding area. 3.  You are not to eat or drink anything after midnight the night before your procedure. 4. Please continue to take your medications with a small sip of water on the morning of the procedure with the following exceptions. 5. If you are diabetic, do not take your insulin/sugar pill the morning of the procedure. 6. We encourage families to wait in the waiting room on the first floor while the procedure is being done. The Doctor will come out and talk with you as soon as the procedure is over. 7. There is the possibility that you may spend the night in the hospital, depending on the results of the procedure. This will be determined after the procedure is done. 8.   If you or your family have any questions, please call our office Monday -Friday, 9:00 a.m.-4:30 p.m.,  At [phone], and ask to speak to one of the nurses. *Pre -procedure LABWORK is to be done within one week of your procedure date    **YOU WILL NEED SOMEONE TO DRIVE YOU HOME AFTER PROCEDURE.

## 2020-09-10 NOTE — PROGRESS NOTES
Noemi Cuevas presents today for   Chief Complaint   Patient presents with    Follow-up       Noemi Cuevas preferred language for health care discussion is english/other. Is someone accompanying this pt? no    Is the patient using any DME equipment during [address]? no    Depression Screening:  3 most recent PHQ Screens 9/10/2020   Little interest or pleasure in doing things Not at all   Feeling down, depressed, irritable, or hopeless Not at all   Total Score PHQ 2 0       Learning Assessment:  Learning Assessment 6/26/2019   PRIMARY LEARNER Patient   PRIMARY LANGUAGE ENGLISH   LEARNER PREFERENCE PRIMARY DEMONSTRATION     READING   ANSWERED BY Patient   RELATIONSHIP SELF       Abuse Screening:  completed    Fall Risk  completed    Pt currently taking Anticoagulant therapy? yes    Coordination of Care:  1. Have you been to the ER, urgent care clinic since your last visit? Hospitalized since your last visit? no    2. Have you seen or consulted any other health care providers outside of the [address] [place] [address] since your last visit? Include any pap smears or colon screening.   yes

## 2020-09-10 NOTE — PROGRESS NOTES
[facility]    Mr. Sammie Castro is a 63 y.o. male with a history of paroxysmal atrial fibrillation and hyperlipidemia. Mr. Sammie Castro is here for follow-up appointment. Patient has been diagnosed with sleep apnea and is trying to get used to the CPAP machine. He is not able to wear it consistently. He also had appointment with sleep apnea clinic at the [facility] and he was not able to tolerate wearing CPAP. He denies any chest pain or chest tightness. He has occasional palpitation. He denies presyncope or syncope. Overall his symptoms are improving  He had to go to [facility] for palpitation and was found to have a atrial flutter with rapid ventricular response. He tells me that his dose of diltiazem was increased however after increasing the dose, he started feeling fatigue and tired so he reduce his dose back to 280 mg daily. He is taking Xarelto and amiodarone regularly. Denies any nausea, vomiting, abdominal pain, fever, chills, sputum production. No hematuria or other bleeding complaints    Past Medical History:   Diagnosis Date    A-fib [facility])     S/P cardioversion     HLD (hyperlipidemia)     Sleep apnea          No past surgical history on file. Current Outpatient Medications   Medication Sig    cholecalciferol, vitamin D3, (VITAMIN D3) 2,000 unit tab Take  by mouth.  rivaroxaban (XARELTO) 20 mg tab tablet Take 1 Tab by mouth daily.  amiodarone (CORDARONE) 200 mg tablet Take 1 Tab by mouth daily.  dilTIAZem ER (Cardizem LA) 240 mg tablet Take 240 mg by mouth daily. No current facility-administered medications for this visit. Allergies and Sensitivities:  No Known Allergies    Family History:  No family history on file.     Social History:  Social History     Tobacco Use    Smoking status: Former Smoker     Types: Cigarettes     Last attempt to quit: 2008     Years since quittin.7    Smokeless tobacco: Never Used   Substance Use Topics    Alcohol use: Not on file    Drug use: Not on file     He  reports that he quit smoking about 11 years ago. His smoking use included cigarettes. He has never used smokeless tobacco.  He  has no history on file for alcohol. Review of Systems:  Cardiac symptoms as noted above in HPI. All others negative. Denies fatigue, malaise, skin rash, joint pain, blurring vision, photophobia, neck pain, hemoptysis, chronic cough, nausea, vomiting, hematuria, burning micturition, BRBPR, chronic headaches. Physical Exam:  BP Readings from Last 3 Encounters:   10/29/19 142/89   07/09/19 120/71   07/08/19 125/85         Pulse Readings from Last 3 Encounters:   10/29/19 77   07/09/19 84   07/08/19 70          Wt Readings from Last 3 Encounters:   10/29/19 216 lb (98 kg)   07/09/19 214 lb (97.1 kg)   06/26/19 216 lb 4.8 oz (98.1 kg)       Constitutional: Oriented to person, place, and time. HENT: Head: Normocephalic and atraumatic  Neck: No JVD present. Carotid bruit is not appreciated. Cardiovascular: Irregular rhythm. No murmur, gallop or rubs appreciated  Lung: Breath sounds normal. No respiratory distress. No ronchi or rales appreciated  Abdominal: No tenderness. No rebound and no guarding. Musculoskeletal: There is no lower extremity edema. No cynosis    Review of Data  LABS:   No results found for: NA, K, CL, CO2, GLU, BUN, CREA  No flowsheet data found. No results found for: ALT  No results found for: HBA1C, HGBE8, TVX6NXWE, MQY5NQXX, ROK8RREO    EKG  (10/18) A.fib  (09/20) atrial flutter with 81 bpm.  Nonspecific ST-T changes. ECHO    STRESS TEST (EST, PHARM, NUC, ECHO etc)    IMPRESSION & PLAN:  Mr. Rufino Torres is a 63 y.o. male with a history of atrial fibrillation, hyperlipidemia. Atrial fibrillation:   He has been diagnosed with atrial fibrillation since 2010. In August, 2018 he underwent external cardioversion at [address].   It was successful, however within a few weeks he went back into atrial fibrillation. He is on appropriate medication. He is on amiodarone, Cardizem and Xarelto. I recommend to continue all. Despite multiple attempt, he has not been able to tolerate CPAP machine and cannot use that. Recently was admitted to the hospital with atrial fibrillation with rapid medical response and dose of diltiazem was increased. He continues to have palpitation and symptoms related to atrial fibrillation including fatigue and tiredness. EKG confirmed atrial flutter. Because of his symptoms, I recommend that he consider cardioversion. He is on amiodarone and also Xarelto. Cardioversion with and without DAISY discussed. As patient is on Xarelto for prolonged. The time, ideally DAISY is not recommended. After lengthy discussion, he is agreeable with external cardioversion. Risk-benefit, alternatives of this management strategy discussed with patient in detail. All the risk associated with and complication associated with external cardioversion discussed with the patient. He is agreeable with the procedure. Will be reading moderate sedation    Borderline hyperlipidemia:  He is on diet control. In the past he was tried on Atorvastatin, however he had some side effects so he stopped taking that medication after taking two doses. I would recommend to continue with diet and exercise program.    Hypertension:   He is on Diltiazem 180 mg daily, which I recommend to continue. Sleep apnea:  He has severe sleep apnea and has undergone titration sleep study. He tells me that he is not able to tolerate CPAP machine. I have asked him to follow-up with sleep apnea team.    Obesity:  Current weight 226 pound with BMI of 35. Weight loss advised to help with A. fib episodes. He understand and he is encouraged to lose weight. This plan was discussed with patient who is in agreement.     Thank you for allowing me to participate in patient care. Please feel free to call me if you have any question or concern. Kira Morrison MD  Please note: This document has been produced using voice recognition software. Unrecognized errors in transcription may be present.

## 2020-09-15 ENCOUNTER — DOCUMENTATION ONLY (OUTPATIENT)
Dept: CARDIOLOGY CLINIC | Age: 60
End: 2020-09-15

## 2020-09-15 NOTE — PROGRESS NOTES
Patient already has prior auth from the [place] for cardioversion procedure scheduled 9/18/20.  See scanned document for referral.

## 2020-09-16 ENCOUNTER — HOSPITAL ENCOUNTER (OUTPATIENT)
Dept: PREADMISSION TESTING | Age: 60
Discharge: HOME | End: 2020-09-16
Payer: OTHER GOVERNMENT

## 2020-09-16 DIAGNOSIS — I48.0 PAF (PAROXYSMAL ATRIAL FIBRILLATION) (HCC): ICD-10-CM

## 2020-09-16 LAB
ANION GAP SERPL CALC-SCNC: 3 MMOL/L (ref 3–18)
BUN SERPL-MCNC: 23 MG/DL (ref 7–18)
BUN/CREAT SERPL: 17 (ref 12–20)
CALCIUM SERPL-MCNC: 8.2 MG/DL (ref 8.5–10.1)
CHLORIDE SERPL-SCNC: 110 MMOL/L (ref 100–111)
CO2 SERPL-SCNC: 28 MMOL/L (ref 21–32)
CREAT SERPL-MCNC: 1.32 MG/DL (ref 0.6–1.3)
GLUCOSE SERPL-MCNC: 100 MG/DL (ref 74–99)
POTASSIUM SERPL-SCNC: 3.9 MMOL/L (ref 3.5–5.5)
SODIUM SERPL-SCNC: 141 MMOL/L (ref 136–145)

## 2020-09-16 PROCEDURE — 36415 COLL VENOUS BLD VENIPUNCTURE: CPT

## 2020-09-16 PROCEDURE — 80048 BASIC METABOLIC PNL TOTAL CA: CPT

## 2020-09-18 ENCOUNTER — HOSPITAL ENCOUNTER (OUTPATIENT)
Age: 60
Setting detail: OUTPATIENT SURGERY
Discharge: HOME | End: 2020-09-18
Attending: INTERNAL MEDICINE | Admitting: INTERNAL MEDICINE
Payer: OTHER GOVERNMENT

## 2020-09-18 ENCOUNTER — ANESTHESIA EVENT (OUTPATIENT)
Dept: CARDIAC CATH/INVASIVE PROCEDURES | Age: 60
End: 2020-09-18
Payer: OTHER GOVERNMENT

## 2020-09-18 ENCOUNTER — ANESTHESIA (OUTPATIENT)
Dept: CARDIAC CATH/INVASIVE PROCEDURES | Age: 60
End: 2020-09-18
Payer: OTHER GOVERNMENT

## 2020-09-18 VITALS
SYSTOLIC BLOOD PRESSURE: 132 MMHG | HEIGHT: 71 IN | BODY MASS INDEX: 30.13 KG/M2 | TEMPERATURE: 98.4 F | DIASTOLIC BLOOD PRESSURE: 87 MMHG | HEART RATE: 64 BPM | RESPIRATION RATE: 18 BRPM | OXYGEN SATURATION: 98 %

## 2020-09-18 DIAGNOSIS — I48.0 PAROXYSMAL ATRIAL FIBRILLATION (HCC): ICD-10-CM

## 2020-09-18 LAB
ATRIAL RATE: 260 BPM
CALCULATED P AXIS, ECG09: 75 DEGREES
CALCULATED R AXIS, ECG10: -63 DEGREES
CALCULATED T AXIS, ECG11: 30 DEGREES
DIAGNOSIS, 93000: NORMAL
Q-T INTERVAL, ECG07: 450 MS
QRS DURATION, ECG06: 124 MS
QTC CALCULATION (BEZET), ECG08: 468 MS
VENTRICULAR RATE, ECG03: 65 BPM

## 2020-09-18 PROCEDURE — 92960 CARDIOVERSION ELECTRIC EXT: CPT | Performed by: INTERNAL MEDICINE

## 2020-09-18 PROCEDURE — 74011000250 HC RX REV CODE- 250: Performed by: NURSE ANESTHETIST, CERTIFIED REGISTERED

## 2020-09-18 PROCEDURE — 76210000063 HC OR PH I REC FIRST 0.5 HR: Performed by: INTERNAL MEDICINE

## 2020-09-18 PROCEDURE — 76060000031 HC ANESTHESIA FIRST 0.5 HR: Performed by: INTERNAL MEDICINE

## 2020-09-18 PROCEDURE — 74011250636 HC RX REV CODE- 250/636: Performed by: NURSE ANESTHETIST, CERTIFIED REGISTERED

## 2020-09-18 PROCEDURE — 93005 ELECTROCARDIOGRAM TRACING: CPT

## 2020-09-18 PROCEDURE — 74011250636 HC RX REV CODE- 250/636: Performed by: INTERNAL MEDICINE

## 2020-09-18 RX ORDER — LIDOCAINE HYDROCHLORIDE 20 MG/ML
INJECTION, SOLUTION EPIDURAL; INFILTRATION; INTRACAUDAL; PERINEURAL AS NEEDED
Status: DISCONTINUED | OUTPATIENT
Start: 2020-09-18 | End: 2020-09-18 | Stop reason: HOSPADM

## 2020-09-18 RX ORDER — SODIUM CHLORIDE 9 MG/ML
INJECTION, SOLUTION INTRAVENOUS
Status: DISCONTINUED | OUTPATIENT
Start: 2020-09-18 | End: 2020-09-18 | Stop reason: HOSPADM

## 2020-09-18 RX ORDER — PROPOFOL 10 MG/ML
INJECTION, EMULSION INTRAVENOUS AS NEEDED
Status: DISCONTINUED | OUTPATIENT
Start: 2020-09-18 | End: 2020-09-18 | Stop reason: HOSPADM

## 2020-09-18 RX ORDER — SODIUM CHLORIDE, SODIUM LACTATE, POTASSIUM CHLORIDE, CALCIUM CHLORIDE 600; 310; 30; 20 MG/100ML; MG/100ML; MG/100ML; MG/100ML
25 INJECTION, SOLUTION INTRAVENOUS CONTINUOUS
Status: DISCONTINUED | OUTPATIENT
Start: 2020-09-18 | End: 2020-09-18 | Stop reason: HOSPADM

## 2020-09-18 RX ADMIN — PROPOFOL 50 MG: 10 INJECTION, EMULSION INTRAVENOUS at 13:26

## 2020-09-18 RX ADMIN — SODIUM CHLORIDE, SODIUM LACTATE, POTASSIUM CHLORIDE, AND CALCIUM CHLORIDE 25 ML/HR: 600; 310; 30; 20 INJECTION, SOLUTION INTRAVENOUS at 11:49

## 2020-09-18 RX ADMIN — SODIUM CHLORIDE: 900 INJECTION, SOLUTION INTRAVENOUS at 13:10

## 2020-09-18 RX ADMIN — PROPOFOL 50 MG: 10 INJECTION, EMULSION INTRAVENOUS at 13:25

## 2020-09-18 RX ADMIN — LIDOCAINE HYDROCHLORIDE 60 MG: 20 INJECTION, SOLUTION INTRAVENOUS at 13:25

## 2020-09-18 NOTE — ANESTHESIA POSTPROCEDURE EVALUATION
Procedure(s):  EP CARDIOVERSION. MAC    Anesthesia Post Evaluation      Multimodal analgesia: multimodal analgesia used between 6 hours prior to anesthesia start to PACU discharge  Patient location during evaluation: bedside  Patient participation: complete - patient participated  Level of consciousness: awake  Pain management: adequate  Airway patency: patent  Anesthetic complications: no  Cardiovascular status: stable  Respiratory status: acceptable  Hydration status: acceptable  Post anesthesia nausea and vomiting:  controlled      INITIAL Post-op Vital signs:   Vitals Value Taken Time   /91 9/18/2020  2:15 PM   Temp     Pulse 64 9/18/2020  2:17 PM   Resp 18 9/18/2020  2:17 PM   SpO2 95 % 9/18/2020  2:17 PM   Vitals shown include unvalidated device data.

## 2020-09-18 NOTE — DISCHARGE INSTRUCTIONS
Patient Education   Patient Education     DISCHARGE SUMMARY from Nurse    PATIENT INSTRUCTIONS:    After general anesthesia or intravenous sedation, for 24 hours or while taking prescription Narcotics:  · Limit your activities  · Do not drive and operate hazardous machinery  · Do not make important personal or business decisions  · Do  not drink alcoholic beverages  · If you have not urinated within 8 hours after discharge, please contact your surgeon on call. Report the following to your surgeon:  · Excessive pain, swelling, redness or odor of or around the surgical area  · Temperature over 100.5  · Nausea and vomiting lasting longer than 4 hours or if unable to take medications  · Any signs of decreased circulation or nerve impairment to extremity: change in color, persistent  numbness, tingling, coldness or increase pain  · Any questions    What to do at Home:  Recommended activity: Activity as tolerated and no driving for today,       These are general instructions for a healthy lifestyle:    No smoking/ No tobacco products/ Avoid exposure to second hand smoke  Surgeon General's Warning:  Quitting smoking now greatly reduces serious risk to your health. Obesity, smoking, and sedentary lifestyle greatly increases your risk for illness    A healthy diet, regular physical exercise & weight monitoring are important for maintaining a healthy lifestyle    You may be retaining fluid if you have a history of heart failure or if you experience any of the following symptoms:  Weight gain of 3 pounds or more overnight or 5 pounds in a week, increased swelling in our hands or feet or shortness of breath while lying flat in bed. Please call your doctor as soon as you notice any of these symptoms; do not wait until your next office visit. The discharge information has been reviewed with the patient. The patient verbalized understanding.   Discharge medications reviewed with the patient and appropriate educational materials and side effects teaching were provided. ___________________________________________________________________________________________________________________________________   Electrical Cardioversion: What to Expect at Home  Your Recovery     Electrical cardioversion is a treatment for an abnormal heartbeat, such as atrial fibrillation, supraventricular tachycardia, or ventricular tachycardia (VT). Your doctor used a brief electrical shock to reset your heart's rhythm. After the procedure, you may have redness, like a sunburn, where the patches were. The medicines you got to make you sleepy may make you feel drowsy for the rest of the day. Your doctor may have you take medicines to help the heart beat normally and to prevent blood clots. This care sheet gives you a general idea about how long it will take for you to recover. But each person recovers at a different pace. Follow the steps below to feel better as quickly as possible. How can you care for yourself at home? Medicines    · Be safe with medicines. Take your medicines exactly as prescribed. Call your doctor if you think you are having a problem with your medicine. You may take one or more of the following medicines:  ? Rate-control medicines to slow the heart rate. These include beta-blockers, calcium channel blockers, and digoxin. ? Rhythm control medicines that help the heart keep a normal rhythm. ? Blood thinners, also called anticoagulants, which help prevent blood clots. You will get more details on the specific medicines your doctor prescribes. Be sure you know how to take your medicines safely.     · Do not take any vitamins, over-the-counter medicines, or herbal products without talking to your doctor first.   Exercise    · Start light exercise if your doctor says that it's okay. Even a small amount will help you get stronger, have more energy, and manage your stress. Walking is an easy way to get exercise.  Start out by walking a little more than you did in the hospital. Bit by bit, increase the amount you walk.     · When you exercise, watch for signs that your heart is working too hard. You are pushing too hard if you cannot talk while you are exercising. If you become short of breath or dizzy or have chest pain, sit down and rest right away.     · Check your pulse regularly. Place two fingers on the artery at the palm side of your wrist in line with your thumb. If your heartbeat seems uneven or fast, talk to your doctor. Other instructions    · Ask your doctor when you can drive again.     · Do not smoke. If you need help quitting, talk to your doctor about stop-smoking programs and medicines. These can increase your chances of quitting for good.     · Limit alcohol. Follow-up care is a key part of your treatment and safety. Be sure to make and go to all appointments, and call your doctor if you are having problems. It's also a good idea to know your test results and keep a list of the medicines you take. When should you call for help? Call 911 anytime you think you may need emergency care. For example, call if:    · You passed out (lost consciousness).     · You have chest pain or pressure. This may occur with:  ? Sweating. ? Shortness of breath. ? Nausea or vomiting. ? Pain that spreads from the chest to the neck, jaw, or one or both shoulders or arms. ? A fast or uneven pulse. After calling 911, the  may tell you to chew 1 adult-strength or 2 to 4 low-dose aspirin. Wait for an ambulance. Do not try to drive yourself.     · You have symptoms of a stroke. These may include:  ? Sudden numbness, tingling, weakness, or loss of movement in your face, arm, or leg, especially on only one side of your body. ? Sudden vision changes. ? Sudden trouble speaking. ? Sudden confusion or trouble understanding simple statements. ? Sudden problems with walking or balance.   ? A sudden, severe headache that is different from past headaches. Call your doctor now or seek immediate medical care if:    · You feel dizzy or lightheaded, or you feel like you may faint.     · You have a fast or irregular heartbeat. Watch closely for any changes in your health, and be sure to contact your doctor if you have any problems. Where can you learn more? Go to http://amrit-renate.info/  Enter A617 in the search box to learn more about \"Electrical Cardioversion: What to Expect at Home. \"  Current as of: December 16, 2019               Content Version: 12.6  © 9422-8617 [employer]. Care instructions adapted under license by [employer] (which disclaims liability or warranty for this information). If you have questions about a medical condition or this instruction, always ask your healthcare professional. [address] any warranty or liability for your use of this information. Learning About Coronavirus [phone])  Coronavirus [phone]): Overview  What is coronavirus (COVID-19)? The coronavirus disease (COVID-19) is caused by a virus. It is an illness that was first found in December 2019. It has since spread worldwide. The virus can cause fever, cough, and trouble breathing. In severe cases, it can cause pneumonia and make it hard to breathe without help. It can cause death. This virus spreads person-to-person through droplets from coughing and sneezing. It can also spread when you are close to someone who is infected. And it can spread when you touch something that has the virus on it, such as a doorknob or a tabletop. Coronaviruses are a large group of viruses. They cause the common cold. They also cause more serious illnesses like Middle East respiratory syndrome (MERS) and severe acute respiratory syndrome (SARS). COVID-19 is caused by a novel coronavirus. That means it's a new type that has not been seen in people before. How is COVID-19 treated?   Mild illness can be treated at home, but more serious illness needs to be treated in the hospital. Treatment may include medicines to reduce symptoms, plus breathing support such as oxygen therapy or a ventilator. Other treatments, such as antiviral medicines, may help people who have COVID-19. What can you do to protect yourself from COVID-19? The best way to protect yourself from getting sick is to:  · Avoid areas where there is an outbreak. · Avoid contact with people who may be infected. · Avoid crowds and try to stay at least 6 feet away from other people. · Wash your hands often, especially after you cough or sneeze. Use soap and water, and scrub for at least 20 seconds. If soap and water aren't available, use an alcohol-based hand . · Avoid touching your mouth, nose, and eyes. What can you do to avoid spreading the virus to others? To help avoid spreading the virus to others:  · Wash your hands often with soap or alcohol-based hand sanitizers. · Cover your mouth with a tissue when you cough or sneeze. Then throw the tissue in the trash. · Use a disinfectant to clean things that you touch often. These include doorknobs, remote controls, phones, and handles on your refrigerator and microwave. And don't forget countertops, tabletops, bathrooms, and computer keyboards. · Wear a cloth face cover if you have to go to public areas. If you know or suspect that you have COVID-19:  · Stay home. Don't go to school, work, or public areas. And don't use public transportation, ride-shares, or taxis unless you have no choice. · Leave your home only if you need to get medical care or testing. But call the doctor's office first so they know you're coming. And wear a face cover. · Limit contact with people in your home. If possible, stay in a separate bedroom and use a separate bathroom. · Wear a face cover whenever you're around other people.  It can help stop the spread of the virus when you cough or sneeze. · Clean and disinfect your home every day. Use household  and disinfectant wipes or sprays. Take special care to clean things that you grab with your hands. · Self-isolate until it's safe to be around others again. ? If you have symptoms, it's safe when you haven't had a fever for 3 days and your symptoms have improved and it's been at least 10 days since your symptoms started. ? If you were exposed to the virus but don't have symptoms, it's safe to be around others 14 days after exposure. ? Talk to your doctor about whether you also need testing, especially if you have a weakened immune system. When to call for help  Call 911 anytime you think you may need emergency care. For example, call if:  · You have severe trouble breathing. (You can't talk at all.)  · You have constant chest pain or pressure. · You are severely dizzy or lightheaded. · You are confused or can't think clearly. · Your face and lips have a blue color. · You passed out (lost consciousness) or are very hard to wake up. Call your doctor now if you develop symptoms such as:  · Shortness of breath. · Fever. · Cough. If you need to get care, call ahead to the doctor's office for instructions before you go. Make sure you wear a face cover to prevent exposing other people to the virus. Where can you get the latest information? The following health organizations are tracking and studying this virus. Their websites contain the most up-to-date information. Jenaro Treadwell also learn what to do if you think you may have been exposed to the virus. · U.S. Centers for Disease Control and Prevention (CDC): The CDC provides updated news about the disease and travel advice. The website also tells you how to prevent the spread of infection. www.cdc.gov  · World Health Organization [facility]): WHO offers information about the virus outbreaks.  WHO also has travel advice. www.who.int  Current as of: July 10, 2020               Content Version: 12.6  © 9009-3050 [employer]. Care instructions adapted under license by [employer] (which disclaims liability or warranty for this information). If you have questions about a medical condition or this instruction, always ask your healthcare professional. [address] any warranty or liability for your use of this information.        .Patient armband removed and shredded

## 2020-09-18 NOTE — ANESTHESIA PREPROCEDURE EVALUATION
Relevant Problems   No relevant active problems       Anesthetic History   No history of anesthetic complications            Review of Systems / Medical History  Patient summary reviewed and pertinent labs reviewed    Pulmonary        Sleep apnea: No treatment           Neuro/Psych   Within defined limits           Cardiovascular            Dysrhythmias : atrial fibrillation      Exercise tolerance: >4 METS     GI/Hepatic/Renal  Within defined limits              Endo/Other  Within defined limits           Other Findings   Comments:                  Physical Exam    Airway  Mallampati: III  TM Distance: 4 - 6 cm  Neck ROM: normal range of motion   Mouth opening: Normal     Cardiovascular  Regular rate and rhythm,  S1 and S2 normal,  no murmur, click, rub, or gallop  Rhythm: regular  Rate: normal         Dental  No notable dental hx       Pulmonary  Breath sounds clear to auscultation               Abdominal  GI exam deferred       Other Findings            Anesthetic Plan    ASA: 3  Anesthesia type: MAC          Induction: Intravenous  Anesthetic plan and risks discussed with: Patient

## 2020-09-18 NOTE — PERIOP NOTES
Phase 2 Recovery Summary    Report received from [address] [place] [address]:    09/18/20 1414 09/18/20 1415 09/18/20 1416 09/18/20 1421   BP:  (!) 144/91  132/87   Pulse: 65 65 64 64   Resp: 19 20 17 18   Temp:       SpO2: 97% 97% 96% 98%   Height:           oriented to time, place, person and situation          Lines and Drains  Peripheral Intravenous Line:  Removed prior to discharge    Wound        VSS. No complaints. Patient assisted to chair with minimal assist. Pateint tolerating liquids well. Discharge discussed with patient and family. No questions or concerns at this time. Patient had time to ask any questions. Pain at 0/10. Discharge medications reviewed with patient and appropriate educational materials and side effects teaching were provided. Patient discharged to home with his son without incident.        Park Valadez RN

## 2020-09-18 NOTE — H&P
Please see clinic note  for detail. I saw and examined patient and confirmed above. No interval change. Labs reviewed. Procedure explained to patient and all risk and benefit discussed with patient. Risk, benefit, complication of external cardioversion ( including but not limited to , heart failure, stroke, MI, death ) were discussed with patient and willing to proceed with procedure. Proceed as planned. History and physical has been reviewed.  There have been no significant clinical changes since the completion of the originally dated History and Physical.  Will be using moderate sedation.    ------------------------------------------------------------------------------------------------------------------

## 2020-09-20 LAB
ATRIAL RATE: 62 BPM
CALCULATED P AXIS, ECG09: 64 DEGREES
CALCULATED R AXIS, ECG10: -45 DEGREES
CALCULATED T AXIS, ECG11: 37 DEGREES
DIAGNOSIS, 93000: NORMAL
P-R INTERVAL, ECG05: 192 MS
Q-T INTERVAL, ECG07: 478 MS
QRS DURATION, ECG06: 102 MS
QTC CALCULATION (BEZET), ECG08: 485 MS
VENTRICULAR RATE, ECG03: 62 BPM

## 2020-09-21 ENCOUNTER — TELEPHONE (OUTPATIENT)
Dept: CARDIOLOGY CLINIC | Age: 60
End: 2020-09-21

## 2020-09-21 ENCOUNTER — HOSPITAL ENCOUNTER (OUTPATIENT)
Dept: LAB | Age: 60
Discharge: HOME | End: 2020-09-21
Payer: OTHER GOVERNMENT

## 2020-09-21 DIAGNOSIS — I48.0 PAF (PAROXYSMAL ATRIAL FIBRILLATION) (HCC): Primary | ICD-10-CM

## 2020-09-21 DIAGNOSIS — I48.0 PAF (PAROXYSMAL ATRIAL FIBRILLATION) (HCC): ICD-10-CM

## 2020-09-21 PROCEDURE — 93005 ELECTROCARDIOGRAM TRACING: CPT

## 2020-09-21 NOTE — TELEPHONE ENCOUNTER
Patient calls to complain of increased lethargy since cardioversion on Friday 9/18/20 . Pt is requesting his medication be adjusted. Will discuss above with Dr. David Tee.

## 2020-09-22 NOTE — TELEPHONE ENCOUNTER
PCP: Des Hall MD    Last appt: Visit date not found  No future appointments. Requested Prescriptions     Pending Prescriptions Disp Refills    dilTIAZem ER (CARDIZEM LA) 120 mg tablet 90 Tab 3     Sig: Take 1 Tab by mouth daily.            Other Comments:  D/c cardizem 180 mg

## 2020-09-22 NOTE — TELEPHONE ENCOUNTER
Contacted pt at [facility] number. Two patient Identifiers confirmed. Advised pt per Dr Stephanie Yip. Pt verbalized understanding and requested paper rx. Advised provider was not in office until Thursday but I would forward request to him for review Pt verbalized understanding.

## 2020-09-22 NOTE — TELEPHONE ENCOUNTER
Incoming from pt. Two patient Identifiers confirmed. Advised he is still feeling \" tired, lightheaded and it feels like my heart is slowing down. \" Pt stated \" I hate to say this but i'm going to stop taking something I don't like feeling this way. \" Chula Justin was not in office today but would be back in office Thursday. Advised provider was in hospital tomorrow and I may be able to get a message to him then but that was not certain. Pt verbalized understanding.

## 2020-09-22 NOTE — TELEPHONE ENCOUNTER
Late Entry 9/21/20:   Pt returned call and states that he cannot come into the office tomorrow or Wednesday for BP check with EKG because he is starting a new job. Pt agreeable to go to outpatient lab this afternoon for EKG. Outpatient order for EKG entered.

## 2020-09-22 NOTE — TELEPHONE ENCOUNTER
Verbal order and read back per Milan Matta MD  Cardioversion was yesterday. Pt needs to at least wait 7-10 days for completed results. Pts Cardizem was decreased to 120 mg yesterday.

## 2020-09-23 LAB
ATRIAL RATE: 56 BPM
CALCULATED P AXIS, ECG09: 68 DEGREES
CALCULATED R AXIS, ECG10: -44 DEGREES
CALCULATED T AXIS, ECG11: 70 DEGREES
DIAGNOSIS, 93000: NORMAL
P-R INTERVAL, ECG05: 184 MS
Q-T INTERVAL, ECG07: 464 MS
QRS DURATION, ECG06: 104 MS
QTC CALCULATION (BEZET), ECG08: 447 MS
VENTRICULAR RATE, ECG03: 56 BPM

## 2020-10-13 ENCOUNTER — TELEPHONE (OUTPATIENT)
Dept: CARDIOLOGY CLINIC | Age: 60
End: 2020-10-13

## 2020-10-13 NOTE — TELEPHONE ENCOUNTER
Patient reports he feels like he is in a-fib again approximately 3 week. Pt also reports he hasn't taken his BP/antiarrythmic medications for 7 days due to some \"side effects\" of lethargy. Pt encouraged to restart medications ASAP. Pt states that he is still taking his \"blood thinners\" with no interruption. Will make Dr. Patsy Fallon aware. This has been fully explained to the patient, who indicates understanding.

## 2020-11-09 ENCOUNTER — OFFICE VISIT (OUTPATIENT)
Dept: CARDIOLOGY CLINIC | Age: 60
End: 2020-11-09
Payer: OTHER GOVERNMENT

## 2020-11-09 ENCOUNTER — TELEPHONE (OUTPATIENT)
Dept: CARDIOLOGY CLINIC | Age: 60
End: 2020-11-09

## 2020-11-09 VITALS
HEART RATE: 52 BPM | TEMPERATURE: 98.3 F | DIASTOLIC BLOOD PRESSURE: 88 MMHG | WEIGHT: 223 LBS | BODY MASS INDEX: 31.1 KG/M2 | SYSTOLIC BLOOD PRESSURE: 149 MMHG | OXYGEN SATURATION: 97 %

## 2020-11-09 DIAGNOSIS — I48.0 PAF (PAROXYSMAL ATRIAL FIBRILLATION) (HCC): Primary | ICD-10-CM

## 2020-11-09 PROCEDURE — 93000 ELECTROCARDIOGRAM COMPLETE: CPT | Performed by: INTERNAL MEDICINE

## 2020-11-09 PROCEDURE — 99215 OFFICE O/P EST HI 40 MIN: CPT | Performed by: INTERNAL MEDICINE

## 2020-11-09 RX ORDER — FUROSEMIDE 20 MG/1
20 TABLET ORAL
Qty: 90 TAB | Refills: 3 | Status: SHIPPED | OUTPATIENT
Start: 2020-11-09

## 2020-11-09 NOTE — TELEPHONE ENCOUNTER
Incoming from pt. Two patient Identifiers confirmed. Advised pt per notes below. Pt verbalized understanding.

## 2020-11-09 NOTE — TELEPHONE ENCOUNTER
Attempted to contact pt at  number, no answer. Lvm for pt to return call to office at [phone]. Will continue to try to contact pt. Re: need to change date and time of cardioversion.  New date: 11/16/2020 at 12 pm.

## 2020-11-09 NOTE — PATIENT INSTRUCTIONS
Testing   Echo  Please call [facility] scheduling at [phone]  to schedule an appointment. All testing is completed at [address] [facility]  **[facility]          Patient  Cardioversion Instructions          1. You are scheduled to have a cardioversion on  11/23/2020 , at 10 am.    Please check in at 8 am.      2.    Please go to [facility] main entrance. Once you enter check in with the  there. The  will either give you directions or assist you in getting to the cath holding area.        3. You are not to eat or drink anything after midnight the night before your procedure.       4. Please continue to take your medications with a small sip of water on the morning of the procedure with the following exceptions.      5. If you are diabetic, do not take your insulin/sugar pill the morning of the procedure.     6. We encourage families to wait in the waiting room on the first floor while the procedure is being done. The Doctor will come out and talk with you as soon as the procedure is over.      7. There is the possibility that you may spend the night in the hospital, depending on the results of the procedure. This will be determined after the procedure is done.      8. If you or your family have any questions, please call our office Monday -Friday, 9:00 a.m.-4:30 p.m.,  At [phone], and ask to speak to one of the nurses.     *Pre -procedure LABWORK is to be done within one week of your procedure date     **YOU WILL NEED SOMEONE TO DRIVE YOU HOME AFTER PROCEDURE.

## 2020-11-09 NOTE — PROGRESS NOTES
Samuel Triplett presents today for f/u. Sameul Triplett preferred language for health care discussion is english/other. Personal Protective Equipment:   Personal Protective Equipment was used including: mask-surgical and hands-gloves. Patient was placed on no precaution(s). Patient was masked. Is someone accompanying this pt? no    Is the patient using any DME equipment during [address]? no    Depression Screening:  3 most recent PHQ Screens 9/10/2020   Little interest or pleasure in doing things Not at all   Feeling down, depressed, irritable, or hopeless Not at all   Total Score PHQ 2 0       Learning Assessment:  Learning Assessment 6/26/2019   PRIMARY LEARNER Patient   PRIMARY LANGUAGE ENGLISH   LEARNER PREFERENCE PRIMARY DEMONSTRATION     READING   ANSWERED BY Patient   RELATIONSHIP SELF       Abuse Screening:  Abuse Screening Questionnaire 9/10/2020   Do you ever feel afraid of your partner? N   Are you in a relationship with someone who physically or mentally threatens you? N   Is it safe for you to go home? Y       Fall Risk  Fall Risk Assessment, last 12 mths 9/10/2020   Able to walk? Yes   Fall in past 12 months? No       Pt currently taking Anticoagulant therapy? yes    Coordination of Care:  1. Have you been to the ER, urgent care clinic since your last visit? Hospitalized since your last visit? no    2. Have you seen or consulted any other health care providers outside of the [address] [place] [address] since your last visit? Include any pap smears or colon screening.  *no

## 2020-11-09 NOTE — PROGRESS NOTES
[facility]    Mr. Shreya Pina is a 63 y.o. male with a history of paroxysmal atrial fibrillation and hyperlipidemia. Mr. Shreya Pina is here for follow-up appointment. He underwent cardioversion approximately 4 weeks ago. Few weeks ago patient had some dizzy episode and some funny feeling in the chest.  He thought that he may be back in A. fib. He is feeling back okay now. He has some lower extremity swelling. Couple weeks ago when he felt the dizziness, he reduce his Cardizem dose himself to 120 mg daily. He stopped amiodarone for a week and now he is taking that again. He is taking Xarelto without any interruption  Denies any nausea, vomiting, abdominal pain, fever, chills, sputum production. No hematuria or other bleeding complaints    Past Medical History:   Diagnosis Date    A-fib [facility])     S/P cardioversion     HLD (hyperlipidemia)     Sleep apnea          Past Surgical History:   Procedure Laterality Date    [place] CARDIOVERSION ELECTIVE ARRHYTHMIA EXTERNAL N/A 2020    EP CARDIOVERSION performed by Benny Tidwell MD at [address] [place] [address] CATH LAB       Current Outpatient Medications   Medication Sig    dilTIAZem ER (CARDIZEM LA) 120 mg tablet Take 1 Tab by mouth daily.  cholecalciferol, vitamin D3, (VITAMIN D3) 2,000 unit tab Take  by mouth.  rivaroxaban (XARELTO) 20 mg tab tablet Take 1 Tab by mouth daily.  amiodarone (CORDARONE) 200 mg tablet Take 1 Tab by mouth daily. (Patient taking differently: Take 100 mg by mouth daily.)     No current facility-administered medications for this visit. Allergies and Sensitivities:  No Known Allergies    Family History:  No family history on file.     Social History:  Social History     Tobacco Use    Smoking status: Former Smoker     Types: Cigarettes     Last attempt to quit: 2008     Years since quittin.9    Smokeless tobacco: Never Used   Substance Use Topics  Alcohol use: Not on file    Drug use: Not on file     He  reports that he quit smoking about 11 years ago. His smoking use included cigarettes. He has never used smokeless tobacco.  He  has no history on file for alcohol. Review of Systems:  Cardiac symptoms as noted above in HPI. All others negative. Denies fatigue, malaise, skin rash, joint pain, blurring vision, photophobia, neck pain, hemoptysis, chronic cough, nausea, vomiting, hematuria, burning micturition, BRBPR, chronic headaches. Physical Exam:  BP Readings from Last 3 Encounters:   11/09/20 (!) 149/88   09/18/20 132/87   10/29/19 142/89         Pulse Readings from Last 3 Encounters:   11/09/20 (!) 52   09/18/20 64   10/29/19 77          Wt Readings from Last 3 Encounters:   11/09/20 223 lb (101.2 kg)   10/29/19 216 lb (98 kg)   07/09/19 214 lb (97.1 kg)       Constitutional: Oriented to person, place, and time. HENT: Head: Normocephalic and atraumatic  Neck: No JVD present. Carotid bruit is not appreciated. Cardiovascular: Irregular rhythm. No murmur, gallop or rubs appreciated  Lung: Breath sounds normal. No respiratory distress. No ronchi or rales appreciated  Abdominal: No tenderness. No rebound and no guarding. Musculoskeletal: There is trace/1+ lower extremity edema. No cynosis    Review of Data  LABS:   Lab Results   Component Value Date/Time    Sodium 141 09/16/2020 08:26 AM    Potassium 3.9 09/16/2020 08:26 AM    Chloride 110 09/16/2020 08:26 AM    CO2 28 09/16/2020 08:26 AM    Glucose 100 (H) 09/16/2020 08:26 AM    BUN 23 (H) 09/16/2020 08:26 AM    Creatinine 1.32 (H) 09/16/2020 08:26 AM     No flowsheet data found. No results found for: ALT  No results found for: HBA1C, HGBE8, KZR9EHAH, RFR2OQFE, DNI1CCMO    EKG  (10/18) A.fib  (09/20) atrial flutter with 81 bpm.  Nonspecific ST-T changes.   (10/20) atrial flutter at 87 bpm    ECHO    STRESS TEST (EST, PHARM, NUC, ECHO etc)    IMPRESSION & PLAN:  Mr. Dong Dunaway is a 63 y.o. male with a history of atrial fibrillation, hyperlipidemia. Atrial fibrillation:   He has been diagnosed with atrial fibrillation since 2010. In August, 2018 he underwent external cardioversion at [address]. It was successful, however within a few weeks he went back into atrial fibrillation. Repeat cardioversion was in September 2020 with successful cardioversion to sinus rhythm  He is on amiodarone, Cardizem and Xarelto. I recommend to continue all. Now he is back in atrial flutter. He is symptomatic with it. Despite multiple attempt, he has not been able to tolerate CPAP machine and cannot use that. He continues to have palpitation and symptoms related to atrial fibrillation including fatigue and tiredness. EKG confirmed atrial flutter. Because of his symptoms, I recommend that he consider repeat cardioversion as now he is on amiodarone for about 2 months and hope that he will stay in rhythm with antiarrhythmic on board for longer. The time. Cardioversion with and without DAISY discussed. As patient is on Xarelto for long uninterrupted time, ideally DAISY is not recommended. After lengthy discussion, he is agreeable with external cardioversion. Risk-benefit, alternatives of this management strategy discussed with patient in detail. All the risk associated with and complication associated with external cardioversion discussed with the patient. He is agreeable with the procedure. Will be reading moderate sedation  If in future he gets back in atrial flutter, will consider ablation. Borderline hyperlipidemia:  He is on diet control. In the past he was tried on Atorvastatin, however he had some side effects so he stopped taking that medication after taking two doses. I would recommend to continue with diet and exercise program.    Hypertension:   He is on Diltiazem 120 mg daily, which I recommend to continue.     Sleep apnea:  He has severe sleep apnea and has undergone titration sleep study. He tells me that he is not able to tolerate CPAP machine but multiple effort. I have asked him to follow-up with sleep apnea team again. Obesity:  Current weight 223 pound with BMI of 35. Weight loss advised to help with A. fib episodes. This plan was discussed with patient who is in agreement. Thank you for allowing me to participate in patient care. Please feel free to call me if you have any question or concern. Hood White MD  Please note: This document has been produced using voice recognition software. Unrecognized errors in transcription may be present.

## 2020-11-11 ENCOUNTER — TELEPHONE (OUTPATIENT)
Dept: CARDIOLOGY CLINIC | Age: 60
End: 2020-11-11

## 2020-11-11 NOTE — TELEPHONE ENCOUNTER
Patient called to report that echo is scheduled for 11/17. Echo was supposed to be completed before Cardioversion. Please contact patient to make alternate appointment.

## 2020-11-12 NOTE — TELEPHONE ENCOUNTER
Attempted to contact pt at  number, no answer. Lvm on secure line per Dr Tootie Melgar below. No other issues noted.

## 2020-11-12 NOTE — TELEPHONE ENCOUNTER
Verbal order and read back per Alie Elias MD  [facility] to reschedule to December.

## 2020-11-12 NOTE — TELEPHONE ENCOUNTER
Contacted pt at [facility] number. Two patient Identifiers confirmed. Advised he did not want to miss 2 days of wk it he same week. Pt requested that cardioversion be scheduled for December. Will advise with Dr Carrie Roman.

## 2020-11-17 ENCOUNTER — HOSPITAL ENCOUNTER (OUTPATIENT)
Dept: NON INVASIVE DIAGNOSTICS | Age: 60
Discharge: HOME | End: 2020-11-17
Attending: INTERNAL MEDICINE
Payer: OTHER GOVERNMENT

## 2020-11-17 VITALS
HEIGHT: 71 IN | BODY MASS INDEX: 31.22 KG/M2 | DIASTOLIC BLOOD PRESSURE: 88 MMHG | SYSTOLIC BLOOD PRESSURE: 149 MMHG | WEIGHT: 223 LBS

## 2020-11-17 DIAGNOSIS — I48.0 PAF (PAROXYSMAL ATRIAL FIBRILLATION) (HCC): ICD-10-CM

## 2020-11-17 LAB
ECHO AO ARCH DIAM: 2.76 CM
ECHO AO ASC DIAM: 3.44 CM
ECHO AO ROOT DIAM: 3.53 CM
ECHO IVC PROX: 1.99 CM
ECHO IVC SNIFF: 1.99 CM
ECHO LA AREA 2C: 26.23 CM2
ECHO LA AREA 4C: 21.7 CM2
ECHO LA MAJOR AXIS: 4.6 CM
ECHO LA MINOR AXIS: 2.08 CM
ECHO LA TO AORTIC ROOT RATIO: 1.3
ECHO LA VOL 2C: 94.53 ML (ref 18–58)
ECHO LA VOL 4C: 62.37 ML (ref 18–58)
ECHO LA VOL BP: 82.66 ML (ref 18–58)
ECHO LA VOL BP: 98.99 ML (ref 18–58)
ECHO LA VOLUME INDEX A2C: 42.8 ML/M2 (ref 16–28)
ECHO LA VOLUME INDEX A4C: 28.24 ML/M2 (ref 16–28)
ECHO LV EDV A2C: 145.1 ML
ECHO LV EDV A4C: 129.4 ML
ECHO LV EDV BP: 139.9 ML (ref 67–155)
ECHO LV EDV INDEX A4C: 58.6 ML/M2
ECHO LV EDV INDEX BP: 63.3 ML/M2
ECHO LV EDV NDEX A2C: 65.7 ML/M2
ECHO LV EDV TEICHHOLZ: 0.82 ML
ECHO LV EJECTION FRACTION A2C: 41 %
ECHO LV EJECTION FRACTION A4C: 58 %
ECHO LV EJECTION FRACTION BIPLANE: 49.1 % (ref 55–100)
ECHO LV ESV A2C: 86 ML
ECHO LV ESV A4C: 54.9 ML
ECHO LV ESV BP: 71.3 ML (ref 22–58)
ECHO LV ESV INDEX A2C: 38.9 ML/M2
ECHO LV ESV INDEX A4C: 24.9 ML/M2
ECHO LV ESV INDEX BP: 32.3 ML/M2
ECHO LV ESV TEICHHOLZ: 0.51 ML
ECHO LV INTERNAL DIMENSION DIASTOLIC: 5.49 CM (ref 4.2–5.9)
ECHO LV INTERNAL DIMENSION SYSTOLIC: 4.5 CM
ECHO LV IVSD: 1.22 CM (ref 0.6–1)
ECHO LV MASS 2D: 239.8 G (ref 88–224)
ECHO LV MASS INDEX 2D: 108.6 G/M2 (ref 49–115)
ECHO LV POSTERIOR WALL DIASTOLIC: 0.97 CM (ref 0.6–1)
ECHO LVOT DIAM: 2.31 CM
ECHO LVOT PEAK GRADIENT: 3.9 MMHG
ECHO LVOT SV: 82.5 ML
ECHO LVOT SV: 82.6 ML
ECHO LVOT VTI: 19.63 CM
ECHO RA MINOR AXIS: 4.08 CM
ECHO RV TAPSE: 2.05 CM (ref 1.5–2)
LVFS 2D: 18.17 %
LVOT MG: 2.36 MMHG
LVOT MV: 0.73 CM/S
LVSV (MOD BI): 30.49 ML
LVSV (MOD SINGLE 4C): 33.09 ML
LVSV (MOD SINGLE): 26.26 ML
LVSV (TEICH): 24.36 ML

## 2020-11-17 PROCEDURE — 93306 TTE W/DOPPLER COMPLETE: CPT

## 2020-11-24 ENCOUNTER — TELEPHONE (OUTPATIENT)
Dept: CARDIOLOGY CLINIC | Age: 60
End: 2020-11-24

## 2020-11-24 NOTE — TELEPHONE ENCOUNTER
Attempted to contact pt at  number, no answer. Lvm for pt to return call to office at [phone]. Will continue to try to contact pt.        Re:  next available Monday was 12/28/20 with Dr Princess Arroyo

## 2020-11-24 NOTE — TELEPHONE ENCOUNTER
Patient contacted the office and confirmed the date of the cardioversion on 12/28/2020. Patient agreed to a detailed voicemail with the time for the cardioversion. Verbalized understanding and tolerated well.

## 2020-11-25 NOTE — TELEPHONE ENCOUNTER
Attempted to contact pt at  number, no answer. Lvm on secure line cardioversion scheduled for 10 am. Advised pt to contact office at [phone] if further clarification. No other issues noted.

## 2020-12-14 ENCOUNTER — TELEPHONE (OUTPATIENT)
Dept: CARDIOLOGY CLINIC | Age: 60
End: 2020-12-14

## 2020-12-14 NOTE — TELEPHONE ENCOUNTER
Attempted to contact pt at  number, no answer. Lvm on secure line cardioversion rescheduled to 0900 with arrival time at 0700 on 12/28/2020  for pt to return call to office at [phone]. Advised pt to contact office to confirm receipt of call and time change.

## 2020-12-14 NOTE — TELEPHONE ENCOUNTER
Incoming from pt. Two patient Identifiers confirmed. Advised pt per time change. Pt verbalized understanding.

## 2020-12-23 ENCOUNTER — HOSPITAL ENCOUNTER (OUTPATIENT)
Dept: PREADMISSION TESTING | Age: 60
Discharge: HOME | End: 2020-12-23
Payer: OTHER GOVERNMENT

## 2020-12-23 ENCOUNTER — TELEPHONE (OUTPATIENT)
Dept: CARDIOLOGY CLINIC | Age: 60
End: 2020-12-23

## 2020-12-23 DIAGNOSIS — I48.0 PAF (PAROXYSMAL ATRIAL FIBRILLATION) (HCC): ICD-10-CM

## 2020-12-23 LAB
ANION GAP SERPL CALC-SCNC: 4 MMOL/L (ref 3–18)
BUN SERPL-MCNC: 17 MG/DL (ref 7–18)
BUN/CREAT SERPL: 13 (ref 12–20)
CALCIUM SERPL-MCNC: 8.8 MG/DL (ref 8.5–10.1)
CHLORIDE SERPL-SCNC: 112 MMOL/L (ref 100–111)
CO2 SERPL-SCNC: 27 MMOL/L (ref 21–32)
CREAT SERPL-MCNC: 1.29 MG/DL (ref 0.6–1.3)
GLUCOSE SERPL-MCNC: 102 MG/DL (ref 74–99)
POTASSIUM SERPL-SCNC: 3.8 MMOL/L (ref 3.5–5.5)
SODIUM SERPL-SCNC: 143 MMOL/L (ref 136–145)

## 2020-12-23 PROCEDURE — 36415 COLL VENOUS BLD VENIPUNCTURE: CPT

## 2020-12-23 PROCEDURE — 80048 BASIC METABOLIC PNL TOTAL CA: CPT

## 2020-12-23 NOTE — TELEPHONE ENCOUNTER
Patient contacted the office and asked if anything needed to be complete beofre the cardioversion that is scheduled for 12/28/2020. Advised patient that the BMP needs to be completed before the procedure. Patient verbalized understanding and tolerated well. Patient stated that he will go to have his labs drawn today at [place]. Encounter closed.

## 2020-12-27 ENCOUNTER — ANESTHESIA EVENT (OUTPATIENT)
Dept: CARDIAC CATH/INVASIVE PROCEDURES | Age: 60
End: 2020-12-27
Payer: OTHER GOVERNMENT

## 2020-12-28 ENCOUNTER — ANESTHESIA (OUTPATIENT)
Dept: CARDIAC CATH/INVASIVE PROCEDURES | Age: 60
End: 2020-12-28
Payer: OTHER GOVERNMENT

## 2020-12-28 ENCOUNTER — HOSPITAL ENCOUNTER (OUTPATIENT)
Age: 60
Setting detail: OUTPATIENT SURGERY
Discharge: HOME | End: 2020-12-28
Attending: INTERNAL MEDICINE | Admitting: INTERNAL MEDICINE
Payer: OTHER GOVERNMENT

## 2020-12-28 VITALS
TEMPERATURE: 97.1 F | HEART RATE: 65 BPM | RESPIRATION RATE: 16 BRPM | SYSTOLIC BLOOD PRESSURE: 122 MMHG | OXYGEN SATURATION: 100 % | WEIGHT: 222 LBS | DIASTOLIC BLOOD PRESSURE: 86 MMHG | BODY MASS INDEX: 30.96 KG/M2

## 2020-12-28 DIAGNOSIS — I48.91 A-FIB (HCC): ICD-10-CM

## 2020-12-28 DIAGNOSIS — I48.0 PAROXYSMAL ATRIAL FIBRILLATION (HCC): ICD-10-CM

## 2020-12-28 LAB
ATRIAL RATE: 278 BPM
ATRIAL RATE: 66 BPM
CALCULATED P AXIS, ECG09: 64 DEGREES
CALCULATED R AXIS, ECG10: -50 DEGREES
CALCULATED R AXIS, ECG10: -55 DEGREES
CALCULATED T AXIS, ECG11: 41 DEGREES
CALCULATED T AXIS, ECG11: 53 DEGREES
COVID-19 RAPID TEST, COVR: NOT DETECTED
DIAGNOSIS, 93000: NORMAL
DIAGNOSIS, 93000: NORMAL
P-R INTERVAL, ECG05: 184 MS
Q-T INTERVAL, ECG07: 420 MS
Q-T INTERVAL, ECG07: 444 MS
QRS DURATION, ECG06: 102 MS
QRS DURATION, ECG06: 104 MS
QTC CALCULATION (BEZET), ECG08: 465 MS
QTC CALCULATION (BEZET), ECG08: 496 MS
SOURCE, COVRS: NORMAL
SPECIMEN TYPE, XMCV1T: NORMAL
VENTRICULAR RATE, ECG03: 66 BPM
VENTRICULAR RATE, ECG03: 84 BPM

## 2020-12-28 PROCEDURE — 93005 ELECTROCARDIOGRAM TRACING: CPT

## 2020-12-28 PROCEDURE — 74011250636 HC RX REV CODE- 250/636: Performed by: NURSE ANESTHETIST, CERTIFIED REGISTERED

## 2020-12-28 PROCEDURE — 76210000063 HC OR PH I REC FIRST 0.5 HR: Performed by: INTERNAL MEDICINE

## 2020-12-28 PROCEDURE — 87635 SARS-COV-2 COVID-19 AMP PRB: CPT

## 2020-12-28 PROCEDURE — 01926 ANES IVNTL RAD ICR ICAR/AORT: CPT | Performed by: ANESTHESIOLOGY

## 2020-12-28 PROCEDURE — 92960 CARDIOVERSION ELECTRIC EXT: CPT | Performed by: INTERNAL MEDICINE

## 2020-12-28 PROCEDURE — 76060000032 HC ANESTHESIA 0.5 TO 1 HR: Performed by: INTERNAL MEDICINE

## 2020-12-28 PROCEDURE — 74011000250 HC RX REV CODE- 250: Performed by: NURSE ANESTHETIST, CERTIFIED REGISTERED

## 2020-12-28 RX ORDER — LIDOCAINE HYDROCHLORIDE 20 MG/ML
INJECTION, SOLUTION EPIDURAL; INFILTRATION; INTRACAUDAL; PERINEURAL AS NEEDED
Status: DISCONTINUED | OUTPATIENT
Start: 2020-12-28 | End: 2020-12-28 | Stop reason: HOSPADM

## 2020-12-28 RX ORDER — SODIUM CHLORIDE 0.9 % (FLUSH) 0.9 %
5-40 SYRINGE (ML) INJECTION EVERY 8 HOURS
Status: CANCELLED | OUTPATIENT
Start: 2020-12-28

## 2020-12-28 RX ORDER — SODIUM CHLORIDE, SODIUM LACTATE, POTASSIUM CHLORIDE, CALCIUM CHLORIDE 600; 310; 30; 20 MG/100ML; MG/100ML; MG/100ML; MG/100ML
25 INJECTION, SOLUTION INTRAVENOUS CONTINUOUS
Status: CANCELLED | OUTPATIENT
Start: 2020-12-28

## 2020-12-28 RX ORDER — MAGNESIUM SULFATE 100 %
4 CRYSTALS MISCELLANEOUS AS NEEDED
Status: CANCELLED | OUTPATIENT
Start: 2020-12-28

## 2020-12-28 RX ORDER — PROPOFOL 10 MG/ML
INJECTION, EMULSION INTRAVENOUS AS NEEDED
Status: DISCONTINUED | OUTPATIENT
Start: 2020-12-28 | End: 2020-12-28

## 2020-12-28 RX ORDER — PROPOFOL 10 MG/ML
INJECTION, EMULSION INTRAVENOUS AS NEEDED
Status: DISCONTINUED | OUTPATIENT
Start: 2020-12-28 | End: 2020-12-28 | Stop reason: HOSPADM

## 2020-12-28 RX ORDER — SODIUM CHLORIDE, SODIUM LACTATE, POTASSIUM CHLORIDE, CALCIUM CHLORIDE 600; 310; 30; 20 MG/100ML; MG/100ML; MG/100ML; MG/100ML
50 INJECTION, SOLUTION INTRAVENOUS CONTINUOUS
Status: DISCONTINUED | OUTPATIENT
Start: 2020-12-29 | End: 2020-12-28 | Stop reason: HOSPADM

## 2020-12-28 RX ORDER — SODIUM CHLORIDE 0.9 % (FLUSH) 0.9 %
5-40 SYRINGE (ML) INJECTION AS NEEDED
Status: CANCELLED | OUTPATIENT
Start: 2020-12-28

## 2020-12-28 RX ORDER — LIDOCAINE HYDROCHLORIDE 10 MG/ML
0.1 INJECTION, SOLUTION EPIDURAL; INFILTRATION; INTRACAUDAL; PERINEURAL AS NEEDED
Status: DISCONTINUED | OUTPATIENT
Start: 2020-12-28 | End: 2020-12-28 | Stop reason: HOSPADM

## 2020-12-28 RX ORDER — DEXTROSE 50 % IN WATER (D50W) INTRAVENOUS SYRINGE
25-50 AS NEEDED
Status: CANCELLED | OUTPATIENT
Start: 2020-12-28

## 2020-12-28 RX ADMIN — PROPOFOL 20 MG: 10 INJECTION, EMULSION INTRAVENOUS at 09:41

## 2020-12-28 RX ADMIN — PROPOFOL 50 MG: 10 INJECTION, EMULSION INTRAVENOUS at 09:38

## 2020-12-28 RX ADMIN — LIDOCAINE HYDROCHLORIDE 40 MG: 20 INJECTION, SOLUTION INTRAVENOUS at 09:36

## 2020-12-28 RX ADMIN — PROPOFOL 100 MG: 10 INJECTION, EMULSION INTRAVENOUS at 09:37

## 2020-12-28 RX ADMIN — SODIUM CHLORIDE, SODIUM LACTATE, POTASSIUM CHLORIDE, AND CALCIUM CHLORIDE 50 ML/HR: 600; 310; 30; 20 INJECTION, SOLUTION INTRAVENOUS at 07:45

## 2020-12-28 RX ADMIN — PROPOFOL 30 MG: 10 INJECTION, EMULSION INTRAVENOUS at 09:39

## 2020-12-28 NOTE — DISCHARGE INSTRUCTIONS
Patient Education        Electrical Cardioversion: What to Expect at Home  Your Recovery     Electrical cardioversion is a treatment for an abnormal heartbeat, such as atrial fibrillation, supraventricular tachycardia, or ventricular tachycardia (VT). Your doctor used a brief electrical shock to reset your heart's rhythm. After the procedure, you may have redness, like a sunburn, where the patches were. The medicines you got to make you sleepy may make you feel drowsy for the rest of the day. Your doctor may have you take medicines to help the heart beat normally and to prevent blood clots. This care sheet gives you a general idea about how long it will take for you to recover. But each person recovers at a different pace. Follow the steps below to feel better as quickly as possible. How can you care for yourself at home? Medicines    · Be safe with medicines. Take your medicines exactly as prescribed. Call your doctor if you think you are having a problem with your medicine. You may take one or more of the following medicines:  ? Rate-control medicines to slow the heart rate. These include beta-blockers, calcium channel blockers, and digoxin. ? Rhythm control medicines that help the heart keep a normal rhythm. ? Blood thinners, also called anticoagulants, which help prevent blood clots. You will get more details on the specific medicines your doctor prescribes. Be sure you know how to take your medicines safely.     · Do not take any vitamins, over-the-counter medicines, or herbal products without talking to your doctor first.   Exercise    · Start light exercise if your doctor says that it's okay. Even a small amount will help you get stronger, have more energy, and manage your stress. Walking is an easy way to get exercise. Start out by walking a little more than you did in the hospital. Bit by bit, increase the amount you walk.     · When you exercise, watch for signs that your heart is working too hard. You are pushing too hard if you cannot talk while you are exercising. If you become short of breath or dizzy or have chest pain, sit down and rest right away.     · Check your pulse regularly. Place two fingers on the artery at the palm side of your wrist in line with your thumb. If your heartbeat seems uneven or fast, talk to your doctor. Other instructions    · Ask your doctor when you can drive again.     · Do not smoke. If you need help quitting, talk to your doctor about stop-smoking programs and medicines. These can increase your chances of quitting for good.     · Limit alcohol. Follow-up care is a key part of your treatment and safety. Be sure to make and go to all appointments, and call your doctor if you are having problems. It's also a good idea to know your test results and keep a list of the medicines you take. When should you call for help? Call 911 anytime you think you may need emergency care. For example, call if:    · You passed out (lost consciousness).     · You have chest pain or pressure. This may occur with:  ? Sweating. ? Shortness of breath. ? Nausea or vomiting. ? Pain that spreads from the chest to the neck, jaw, or one or both shoulders or arms. ? A fast or uneven pulse. After calling 911, the  may tell you to chew 1 adult-strength or 2 to 4 low-dose aspirin. Wait for an ambulance. Do not try to drive yourself.     · You have symptoms of a stroke. These may include:  ? Sudden numbness, tingling, weakness, or loss of movement in your face, arm, or leg, especially on only one side of your body. ? Sudden vision changes. ? Sudden trouble speaking. ? Sudden confusion or trouble understanding simple statements. ? Sudden problems with walking or balance. ? A sudden, severe headache that is different from past headaches.    Call your doctor now or seek immediate medical care if:    · You feel dizzy or lightheaded, or you feel like you may faint.     · You have a fast or irregular heartbeat. Watch closely for any changes in your health, and be sure to contact your doctor if you have any problems. Where can you learn more? Go to http://www.gray.com/  Enter A617 in the search box to learn more about \"Electrical Cardioversion: What to Expect at Home. \"  Current as of: December 16, 2019               Content Version: 12.6  © 6818-2369 [employer]. Care instructions adapted under license by [employer] (which disclaims liability or warranty for this information). If you have questions about a medical condition or this instruction, always ask your healthcare professional. [place] [address] any warranty or liability for your use of this information.        .Patient armband removed and shredded

## 2020-12-28 NOTE — PERIOP NOTES
Pt arrives from OR. Placed on monitors. VSS. Chart, MAR and anesthesia record reviewed.  Will monitor status

## 2020-12-28 NOTE — ANESTHESIA PREPROCEDURE EVALUATION
Relevant Problems   No relevant active problems       Anesthetic History   No history of anesthetic complications            Review of Systems / Medical History  Patient summary reviewed, nursing notes reviewed and pertinent labs reviewed    Pulmonary        Sleep apnea: No treatment           Neuro/Psych   Within defined limits           Cardiovascular    Hypertension        Dysrhythmias : atrial fibrillation           GI/Hepatic/Renal  Within defined limits              Endo/Other        Obesity     Other Findings            Physical Exam    Airway  Mallampati: II  TM Distance: 4 - 6 cm  Neck ROM: normal range of motion   Mouth opening: Normal     Cardiovascular    Rhythm: irregular           Dental    Dentition: Upper dentition intact and Lower dentition intact     Pulmonary  Breath sounds clear to auscultation               Abdominal  GI exam deferred       Other Findings            Anesthetic Plan    ASA: 3  Anesthesia type: MAC            Anesthetic plan and risks discussed with: Patient

## 2020-12-28 NOTE — PERIOP NOTES
Pre-Op Summary    Pt arrived via car with family/friend and is oriented to time, place, person and situation. Patient with steady gait with none assistive devices. Visit Vitals  BP (!) 133/98 (BP 1 Location: Left arm, BP Patient Position: At rest)   Pulse 87   Temp 98.1 °F (36.7 °C)   Resp 16   Wt 100.7 kg (222 lb)   SpO2 99%   BMI 30.96 kg/m²       Peripheral IV located on Left hand . Patients belongings are located locked in store room. Patient's point of contact is Xander Nephew, friend and their contact number is: [phone]. They will be called for . They are able to receive medication information. They will be their ride home.

## 2020-12-28 NOTE — PERIOP NOTES
.Report received from Nestor Peace:    12/28/20 1002 12/28/20 1007 12/28/20 1016 12/28/20 1023   BP: 107/79 116/81  122/86   Pulse: 67 65 63 65   Resp: 17 10 13 16   Temp:  97.8 °F (36.6 °C)  97.1 °F (36.2 °C)   SpO2: 97% 95% 99% 100%   Weight:            Patient  is oriented to time, place, person and situation    Patient OOB to chair, and tolerating fluids and activity. Vital signs stable. Pain tolerable. Lines and Drains  Peripheral Intravenous Line:   Peripheral IV 12/28/20 Left Hand (Active)   Site Assessment Clean, dry, & intact 12/28/20 1007   Phlebitis Assessment 0 12/28/20 1007   Infiltration Assessment 0 12/28/20 1007   Dressing Status Clean, dry, & intact 12/28/20 1007   Dressing Type Tape;Transparent 12/28/20 1007   Hub Color/Line Status Pink; Infusing 12/28/20 1007   Alcohol Cap Used Yes 12/28/20 0736       Patient assisted to chair with minimal assistance. Discharge instructions were given to patient and discussed with Benson Schwab [phone]([facility] telephone. ..due to to emergency protocols in place). No questions or concerns at this time. Patient had time to ask any questions. Patient and caregiver verbalized understanding of discharge instructions.      Patient discharged home with Scott Paget, RN

## 2020-12-28 NOTE — ANESTHESIA POSTPROCEDURE EVALUATION
Procedure(s):  EP CARDIOVERSION. MAC    Anesthesia Post Evaluation      Multimodal analgesia: multimodal analgesia used between 6 hours prior to anesthesia start to PACU discharge  Patient location during evaluation: PACU  Patient participation: complete - patient participated  Level of consciousness: awake and alert  Pain management: adequate  Airway patency: patent  Anesthetic complications: no  Cardiovascular status: acceptable and hemodynamically stable  Respiratory status: acceptable  Hydration status: acceptable  Post anesthesia nausea and vomiting:  controlled      INITIAL Post-op Vital signs:   Vitals Value Taken Time   /81 12/28/20 1007   Temp 36.6 °C (97.8 °F) 12/28/20 1007   Pulse 65 12/28/20 1019   Resp 11 12/28/20 1019   SpO2 98 % 12/28/20 1019   Vitals shown include unvalidated device data.

## 2021-03-23 ENCOUNTER — APPOINTMENT (OUTPATIENT)
Dept: PHYSICAL THERAPY | Age: 61
End: 2021-03-23

## 2022-02-18 NOTE — TELEPHONE ENCOUNTER
Verbal order and read back per Sherrell Jasso MD  BP visit with EKG to decreased Cardizem LA 180mg. Left voice mail requesting call back. Render Note In Bullet Format When Appropriate: No Medical Necessity Clause: This procedure was medically necessary because the lesions that were treated were: Medical Necessity Information: It is in your best interest to select a reason for this procedure from the list below. All of these items fulfill various CMS LCD requirements except the new and changing color options. Number Of Freeze-Thaw Cycles: 2 freeze-thaw cycles Show Applicator Variable?: Yes Duration Of Freeze Thaw-Cycle (Seconds): 10 Detail Level: Detailed Application Tool (Optional): Liquid Nitrogen Sprayer Spray Paint Text: The liquid nitrogen was applied to the skin utilizing a spray paint frosting technique.

## (undated) DEVICE — Device